# Patient Record
Sex: FEMALE | Race: WHITE | NOT HISPANIC OR LATINO | Employment: PART TIME | URBAN - METROPOLITAN AREA
[De-identification: names, ages, dates, MRNs, and addresses within clinical notes are randomized per-mention and may not be internally consistent; named-entity substitution may affect disease eponyms.]

---

## 2017-03-22 ENCOUNTER — OFFICE VISIT (OUTPATIENT)
Dept: OBSTETRICS AND GYNECOLOGY | Facility: CLINIC | Age: 52
End: 2017-03-22
Payer: COMMERCIAL

## 2017-03-22 VITALS
DIASTOLIC BLOOD PRESSURE: 84 MMHG | HEIGHT: 60 IN | BODY MASS INDEX: 30.21 KG/M2 | SYSTOLIC BLOOD PRESSURE: 132 MMHG | WEIGHT: 153.88 LBS

## 2017-03-22 DIAGNOSIS — Z12.31 VISIT FOR SCREENING MAMMOGRAM: ICD-10-CM

## 2017-03-22 DIAGNOSIS — Z01.419 ROUTINE GYNECOLOGICAL EXAMINATION: Primary | ICD-10-CM

## 2017-03-22 DIAGNOSIS — N93.9 ABNORMAL UTERINE BLEEDING (AUB): ICD-10-CM

## 2017-03-22 PROCEDURE — 99999 PR PBB SHADOW E&M-EST. PATIENT-LVL III: CPT | Mod: PBBFAC,,, | Performed by: OBSTETRICS & GYNECOLOGY

## 2017-03-22 PROCEDURE — 87591 N.GONORRHOEAE DNA AMP PROB: CPT

## 2017-03-22 PROCEDURE — 88305 TISSUE EXAM BY PATHOLOGIST: CPT | Performed by: PATHOLOGY

## 2017-03-22 PROCEDURE — 88175 CYTOPATH C/V AUTO FLUID REDO: CPT

## 2017-03-22 PROCEDURE — 99396 PREV VISIT EST AGE 40-64: CPT | Mod: S$GLB,,, | Performed by: OBSTETRICS & GYNECOLOGY

## 2017-03-22 PROCEDURE — 88305 TISSUE EXAM BY PATHOLOGIST: CPT | Mod: 26,,, | Performed by: PATHOLOGY

## 2017-03-22 RX ORDER — PREDNISONE 10 MG/1
TABLET ORAL
Refills: 2 | COMMUNITY
Start: 2017-01-27 | End: 2017-03-22

## 2017-03-22 RX ORDER — CETIRIZINE HYDROCHLORIDE 5 MG/1
5 TABLET, CHEWABLE ORAL DAILY
COMMUNITY
End: 2020-02-04

## 2017-03-22 RX ORDER — MONTELUKAST SODIUM 10 MG/1
TABLET ORAL
Refills: 0 | COMMUNITY
Start: 2016-12-19 | End: 2017-03-22

## 2017-03-22 RX ORDER — CLARITHROMYCIN 500 MG/1
TABLET, FILM COATED ORAL
Refills: 1 | COMMUNITY
Start: 2017-01-15 | End: 2017-03-22

## 2017-03-22 RX ORDER — BUTALBITAL, ACETAMINOPHEN AND CAFFEINE 50; 325; 40 MG/1; MG/1; MG/1
TABLET ORAL
Refills: 2 | COMMUNITY
Start: 2016-12-19 | End: 2018-10-29

## 2017-03-22 NOTE — MR AVS SNAPSHOT
[place] [facility]  [address], [address]  [address]th [address] [facility]  [place] [address]  Phone: [phone]                  yMrtle Archer   3/22/2017 3:30 PM   Office Visit    Description:  Female : 1965   Provider:  Kylie Fraser MD   Department:  [facility]           Reason for Visit     Annual Exam           Diagnoses this Visit        Comments    Routine gynecological examination    -  Primary     Abnormal uterine bleeding (AUB)         Visit for screening mammogram                To Do List           Future Appointments        Provider Department Dept Phone    2017 9:15 AM MD [facility] - [phone]      Goals (5 Years of Data)     None      [facility]     [facility] Care [facility] -  Assistance  Registered nurses in the [facility] On Call Center provide clinical advisement, health education, appointment booking, and other advisory services.  Call for this free service at [phone].             Medications           Message regarding Medications     Verify the changes and/or additions to your medication regime listed below are the same as discussed with your clinician today.  If any of these changes or additions are incorrect, please notify your healthcare provider.        STOP taking these medications     predniSONE (DELTASONE) 10 MG tablet TK 1 T PO BID FOR CONGESTION WITH FOOD    clarithromycin (BIAXIN) 500 MG tablet TK 1 T PO  BID FOR INFECTION    montelukast (SINGULAIR) 10 mg tablet TK 1 T PO QD FOR ALLERGY AND CONGESTION           Verify that the below list of medications is an accurate representation of the medications you are currently taking.  If none reported, the list may be blank. If incorrect, please contact your healthcare provider. Carry this list with you in case of emergency.           Current Medications     butalbital-acetaminophen-caffeine -40 mg (FIORICET, ESGIC) -40 mg per tablet TK 1 T PO  Q 4 H PRF  PAIN/HEADACHE    cetirizine (ZYRTEC) 5 MG chewable tablet Take 5 mg by mouth once daily.    phentermine (ADIPEX-P) 37.5 mg tablet Take 37.5 mg by mouth every morning.    trazodone (DESYREL) 50 MG tablet     venlafaxine (EFFEXOR-XR) 150 MG Cp24            Clinical Reference Information           Your Vitals Were     BP Height Weight Last Period BMI    132/84 5' (1.524 m) 69.8 kg (153 lb 14.1 oz) 02/15/2017 30.05 kg/m2      Blood Pressure          Most Recent Value    BP  132/84      Allergies as of 3/22/2017     No Known Allergies      Immunizations Administered on Date of Encounter - 3/22/2017     None      Orders Placed During Today's Visit      Normal Orders This Visit    C. trachomatis/N. gonorrhoeae by AMP DNA Cervix     Liquid-based pap smear, screening     Tissue Specimen To Pathology, Obstetrics/Gynecology     Future Labs/Procedures Expected by Expires    Mammo Digital Screening Bilat with Tomosynthesis CAD  3/22/2017 5/21/2018      [facility] Sign-Up     Activating your [facility] account is as easy as 1-2-3!     1) Visit [email or web address], select Sign Up Now, enter this activation code and your date of birth, then select Next.  [email or web address]  Expires: 5/6/2017  4:46 PM      2) Create a username and password to use when you visit [facility] in the future and select a security question in case you lose your password and select Next.    3) Enter your e-mail address and click Sign Up!    Additional Information  If you have questions, please e-mail [email or web address] or call [phone] to talk to our [facility] staff. Remember, [facility] is NOT to be used for urgent needs. For medical emergencies, dial 911.         Smoking Cessation     If you would like to quit smoking:   You may be eligible for free services if you are a [place] resident and started smoking cigarettes before September 1, 1988.  Call the Smoking [employer]essation [employer] (SCT) toll free at (861) 244-6739 or (449) 801-7568.   Call 9-800-QUIT-NOW if  you do not meet the above criteria.            Language Assistance Services     ATTENTION: Language assistance services are available, free of charge. Please call [phone].      ATENCIÓN: Si habla liane, tiene a holman disposición servicios gratuitos de asistencia lingüística. Llame al [phone].     CHÚ Ý: N?u b?n nói Ti?ng Vi?t, có các d?ch v? h? tr? ngôn ng? mi?n phí dành cho b?n. G?i s? [phone].         [facility] complies with applicable Federal civil rights laws and does not discriminate on the basis of race, color, national origin, age, disability, or sex.

## 2017-03-22 NOTE — PROGRESS NOTES
52 yo female who presents for routine gyn visit.  Patient reports for last 6  Months that her cycles have become a little bit abnormal.  Reports that cycles used to come q 4 wks and last for about 5 days with normal flow, no pain.  In recent months, cycles have been coming q 6 to 8 weeks. She continues to have normal flow and no pain with her cycles.    Patient reports that she now has severe HAs - and is unsure if this is related to hormonal changes as she approaches menopause. Problem has been investigated by PCP and recent MRI was normal.    Patient is . No h/o STDs. But, she desires Gc/chl today.    No h/o abl mammogram - but she does report family history of breast cancer (sister with early diagnosis of breast cancer).    Patient reports that in the past, she has had breast tenderness with her cycles. However, her breasts seem to be more tender even when she is not on her cycle.    Patient is a smoker.    ROS:  GENERAL: Denies weight gain or weight loss. Feeling well overall.   SKIN: Denies rash or lesions.   CHEST: Denies chest pain or shortness of breath.   CARDIOVASCULAR: Denies palpitations or left sided chest pain.   ABDOMEN: No abdominal pain, constipation, diarrhea, nausea, vomiting or rectal bleeding.   URINARY: No frequency, dysuria, hematuria, or burning on urination.  REPRODUCTIVE: See HPI.   BREASTS: per HPI  HEMATOLOGIC: No easy bruisability or excessive bleeding.   MUSCULOSKELETAL: Denies joint pain or swelling.   NEUROLOGIC: Denies syncope or weakness.   PSYCHIATRIC: Denies depression, anxiety or mood swings.     PE:   Vitals: /84  Ht 5' (1.524 m)  Wt 69.8 kg (153 lb 14.1 oz)  LMP 02/15/2017  BMI 30.05 kg/m2  APPEARANCE: Well nourished, well developed, in no acute distress.  CHEST: Lungs clear to auscultation.  HEART: Regular rate and rhythm, no murmurs, rubs or gallops.  ABDOMEN: Soft. No tenderness or masses. No hepatosplenomegaly. No hernias.  BREASTS: Symmetrical, no skin  changes or visible lesions. No palpable masses, nipple discharge or adenopathy bilaterally.  PELVIC: Normal external female genitalia without lesions. Normal hair distribution. Adequate perineal body, normal urethral meatus. Vagina moist and well rugated without lesions or discharge. Cervix pink and without lesions. No significant cystocele or rectocele. Bimanual exam showed uterus normal size, shape, position, mobile and nontender. Adnexa without masses or tenderness. Urethra and bladder normal.  EXTREMITIES: No clubbing cyanosis or edema.      Date:3/22/2017  Time:4:47 PM  Name of the procedure: Endometrial Biopsy  Indications: Myrtle Archer is a 51 y.o. female  who presents today for endometrial biopsy secondary to Abnormal uterine bleeding.  Patient's last menstrual period was 02/15/2017..    Patient consent: Risks/benefits of the procedure were discussed with the patient. Patient's questions were answered.  Consents signed.   TIME OUT completed.  Labs:   Procedure: Speculum placed in vagina; Pap collected; GC/Chl; cervix swabbed with betadine x 2; ring foceps placed on anterior cervix.  Endometrial pipelle advanced without difficulty x 1 pass; single tooth tenaculum removed.  Hemostasis achieved.  Complications: none  EBL: min  Disposition: Pt tolerated the procedure well.    AP  Routine gyn  -s/p normal breast exam: mammogram ordered  -s/p normal pelvic exam:   -Pap smear  -STD testing:  Gc/chl collected, declined HIV  -colonoscopy: completed in  - due in  per GI note  -AUB: EMS collected; Patient instructed to contact MD or report to emergency room for fever greater than 100.4F, vaginal bleeding greater than 2 pads/hour, severe abdominal pain not relieved with NSAIDs.    If EMS is negative for malignancy, will contact patient. She discussed starting HRT to help with HA and hot flashes - but does not want to start that now.    ANA MARÍA Fraser MD

## 2017-03-23 LAB
C TRACH DNA SPEC QL NAA+PROBE: NOT DETECTED
N GONORRHOEA DNA SPEC QL NAA+PROBE: NOT DETECTED

## 2017-03-29 ENCOUNTER — HOSPITAL ENCOUNTER (OUTPATIENT)
Dept: RADIOLOGY | Facility: HOSPITAL | Age: 52
Discharge: HOME | End: 2017-03-29
Attending: OBSTETRICS & GYNECOLOGY
Payer: COMMERCIAL

## 2017-03-29 DIAGNOSIS — Z12.31 VISIT FOR SCREENING MAMMOGRAM: ICD-10-CM

## 2017-03-29 PROCEDURE — 77067 SCR MAMMO BI INCL CAD: CPT | Mod: TC

## 2017-03-29 PROCEDURE — 77063 BREAST TOMOSYNTHESIS BI: CPT | Mod: 26,,, | Performed by: RADIOLOGY

## 2017-03-29 PROCEDURE — 77067 SCR MAMMO BI INCL CAD: CPT | Mod: 26,,, | Performed by: RADIOLOGY

## 2017-03-30 ENCOUNTER — PATIENT MESSAGE (OUTPATIENT)
Dept: OBSTETRICS AND GYNECOLOGY | Facility: CLINIC | Age: 52
End: 2017-03-30

## 2017-03-31 ENCOUNTER — TELEPHONE (OUTPATIENT)
Dept: OBSTETRICS AND GYNECOLOGY | Facility: CLINIC | Age: 52
End: 2017-03-31

## 2017-03-31 NOTE — TELEPHONE ENCOUNTER
Endometrial biopsy results still in process. No result yet.   I see that you are scheduled for f/u mammogram.   We await the results.   Dr dudley

## 2017-03-31 NOTE — TELEPHONE ENCOUNTER
Do you have the results for uterus biopsy?  Also I have to have a  second mammogram. Myrtle

## 2017-04-03 ENCOUNTER — TELEPHONE (OUTPATIENT)
Dept: OBSTETRICS AND GYNECOLOGY | Facility: CLINIC | Age: 52
End: 2017-04-03

## 2017-04-03 NOTE — TELEPHONE ENCOUNTER
Unable to reach pt. I need to let her know that her BX results are still pending.  Once we receive them  We will contact her back.

## 2017-04-05 ENCOUNTER — HOSPITAL ENCOUNTER (OUTPATIENT)
Dept: RADIOLOGY | Facility: HOSPITAL | Age: 52
Discharge: HOME | End: 2017-04-05
Attending: OBSTETRICS & GYNECOLOGY
Payer: COMMERCIAL

## 2017-04-05 DIAGNOSIS — R92.8 ABNORMAL FINDING ON BREAST IMAGING: ICD-10-CM

## 2017-04-05 PROCEDURE — 77061 BREAST TOMOSYNTHESIS UNI: CPT | Mod: TC,LT

## 2017-04-05 PROCEDURE — 76642 ULTRASOUND BREAST LIMITED: CPT | Mod: 26,LT,, | Performed by: RADIOLOGY

## 2017-04-05 PROCEDURE — 77061 BREAST TOMOSYNTHESIS UNI: CPT | Mod: 26,LT,, | Performed by: RADIOLOGY

## 2017-04-05 PROCEDURE — 76642 ULTRASOUND BREAST LIMITED: CPT | Mod: TC,LT

## 2017-04-05 PROCEDURE — 77065 DX MAMMO INCL CAD UNI: CPT | Mod: 26,LT,, | Performed by: RADIOLOGY

## 2018-10-10 ENCOUNTER — TELEPHONE (OUTPATIENT)
Dept: OBSTETRICS AND GYNECOLOGY | Facility: CLINIC | Age: 53
End: 2018-10-10

## 2018-10-10 NOTE — TELEPHONE ENCOUNTER
I spoke with her and explained that Dr. Fraser usually does those orders with the annual exam.  I scheduled h er for her annual on Oct 29.  She understood and had no further questions.

## 2018-10-10 NOTE — TELEPHONE ENCOUNTER
----- Message from Priyank Gupta sent at 10/10/2018 12:38 PM CDT -----  Contact: self  Pt called in about wanting to schedule mammogram. Please put order in system and contact pt to schedule appt      Pt can be reached at [phone]      TY

## 2018-10-29 ENCOUNTER — OFFICE VISIT (OUTPATIENT)
Dept: OBSTETRICS AND GYNECOLOGY | Facility: CLINIC | Age: 53
End: 2018-10-29
Payer: COMMERCIAL

## 2018-10-29 VITALS
WEIGHT: 156.31 LBS | SYSTOLIC BLOOD PRESSURE: 122 MMHG | HEIGHT: 60 IN | BODY MASS INDEX: 30.69 KG/M2 | DIASTOLIC BLOOD PRESSURE: 76 MMHG

## 2018-10-29 DIAGNOSIS — Z01.419 ENCOUNTER FOR GYNECOLOGICAL EXAMINATION WITHOUT ABNORMAL FINDING: Primary | ICD-10-CM

## 2018-10-29 DIAGNOSIS — Z12.39 SCREENING BREAST EXAMINATION: ICD-10-CM

## 2018-10-29 PROCEDURE — 99396 PREV VISIT EST AGE 40-64: CPT | Mod: S$GLB,,, | Performed by: OBSTETRICS & GYNECOLOGY

## 2018-10-29 PROCEDURE — 99999 PR PBB SHADOW E&M-EST. PATIENT-LVL III: CPT | Mod: PBBFAC,,, | Performed by: OBSTETRICS & GYNECOLOGY

## 2018-10-29 NOTE — PROGRESS NOTES
53 y/o  presents for routine gyn visit.  She reports her hot flashes have resolved.  Patient reports menstrual cycles q6w, duration 6 days.   She desires weight loss, is on OTC supplement from [facility], but wants better recommendation.   She is , and denies any new sexual partners, declines STD test today.  Pap last year normal - declines pap today.  Mammogram last year normal; if no lumps found on exam toady, then she wishes to do it in November due to insurance reasons ( was recently laid off).  She is a smoker - smokes 1 pack x 2-3 days for 32 years     ROS:  GENERAL: Denies weight gain or weight loss. Feeling well overall.   SKIN: Denies rash or lesions.   HEAD: Denies head injury or headache.   CHEST: Denies chest pain or shortness of breath.   CARDIOVASCULAR: Denies palpitations or left sided chest pain.   ABDOMEN: No abdominal pain, constipation, diarrhea, nausea, vomiting or rectal bleeding.   URINARY: No frequency, dysuria, hematuria, or burning on urination.  REPRODUCTIVE: See HPI.   BREASTS: denies pain, lumps, or nipple discharge.   HEMATOLOGIC: No easy bruisability or excessive bleeding.   MUSCULOSKELETAL: Denies joint pain or swelling.   NEUROLOGIC: Denies syncope or weakness.   PSYCHIATRIC: Denies depression, anxiety or mood swings.   ?  ?  PE:  /76   Ht 5' (1.524 m)   Wt 70.9 kg (156 lb 4.9 oz)   LMP 10/22/2018   BMI 30.53 kg/m²   APPEARANCE: Well nourished, well developed, in no acute distress.  SKIN: Normal skin turgor, no lesions.  NODES: No cervical or axillary lymph node enlargement.  CHEST: Lungs clear to auscultation.  HEART: Regular rate and rhythm, no murmurs, rubs or gallops.  ABDOMEN: Soft. No tenderness or masses. No hepatosplenomegaly. No hernias.  BREASTS: Symmetrical, no skin changes or visible lesions. No palpable masses, nipple discharge or adenopathy bilaterally.  PELVIC: Normal external female genitalia without lesions. Normal hair distribution. Adequate  perineal body, normal urethral meatus. Vagina moist and well rugated without lesions or discharge. Cervix pink and without lesions. No significant cystocele or rectocele. Bimanual exam showed uterus normal size, shape, position, mobile and nontender. Adnexa without masses or tenderness. Urethra and bladder normal.  EXTREMITIES: No clubbing cyanosis or edema.     AP  Routine GYN  -s/p normal breast exam: mammogram ordered   -s/p normal pelvic exam  -Pap smear 2017 WNL; declined today  -Declined STD testing  -colonoscopy: completed in 2015 - due in 2020 per GI note  - endometrial bx 2017 normal     F/u in one year    alcon dudley MD

## 2018-10-29 NOTE — MEDICAL/APP STUDENT
53 y/o  presents for annual GYN.  She reports her hot flashes are gone.  Patient reports menstrual cycles q6w, bleeds for about 6 days.   She desires weight loss, is on OTC supplement from [facility], but wants better recommendation.   She is , and denies any new sexual partners, declines STD test today.  Pap last year normal - declines pap today.  Mammogram last year normal; if no lumps found on exam toady, then she wishes to do it in November due to insurance reasons ( was recently laid off).  She is a smoker - smokes 1 pack x 2-3 days for 32 years    ROS:  GENERAL: Denies weight gain or weight loss. Feeling well overall.   SKIN: Denies rash or lesions.   HEAD: Denies head injury or headache.   NODES: Denies enlarged lymph nodes.   CHEST: Denies chest pain or shortness of breath.   CARDIOVASCULAR: Denies palpitations or left sided chest pain.   ABDOMEN: No abdominal pain, constipation, diarrhea, nausea, vomiting or rectal bleeding.   URINARY: No frequency, dysuria, hematuria, or burning on urination.  REPRODUCTIVE: See HPI.   BREASTS: The patient performs breast self-examination and denies pain, lumps, or nipple discharge.   HEMATOLOGIC: No easy bruisability or excessive bleeding.   MUSCULOSKELETAL: Denies joint pain or swelling.   NEUROLOGIC: Denies syncope or weakness.   PSYCHIATRIC: Denies depression, anxiety or mood swings.   ?  ?  PE:  /76   Ht 5' (1.524 m)   Wt 70.9 kg (156 lb 4.9 oz)   LMP 10/22/2018   BMI 30.53 kg/m²   APPEARANCE: Well nourished, well developed, in no acute distress.  SKIN: Normal skin turgor, no lesions.  NODES: No cervical or axillary lymph node enlargement.  CHEST: Lungs clear to auscultation.  HEART: Regular rate and rhythm, no murmurs, rubs or gallops.  ABDOMEN: Soft. No tenderness or masses. No hepatosplenomegaly. No hernias.  BREASTS: Symmetrical, no skin changes or visible lesions. No palpable masses, nipple discharge or adenopathy bilaterally.  PELVIC: Normal  external female genitalia without lesions. Normal hair distribution. Adequate perineal body, normal urethral meatus. Vagina moist and well rugated without lesions or discharge. Cervix pink and without lesions. No significant cystocele or rectocele. Bimanual exam showed uterus normal size, shape, position, mobile and nontender. Adnexa without masses or tenderness. Urethra and bladder normal.  EXTREMITIES: No clubbing cyanosis or edema.    AP  Routine GYN  -s/p normal breast exam: mammogram ordered for Nov due to insurance reasons  -s/p normal pelvic exam  -Pap smear 2017 WNL; declined today  -Declined STD testing  -colonoscopy: completed in 2015 - due in 2020 per GI note  - endometrial bx 2017 normal     Zehra Rider, MS3

## 2018-11-08 ENCOUNTER — HOSPITAL ENCOUNTER (OUTPATIENT)
Dept: RADIOLOGY | Facility: HOSPITAL | Age: 53
Discharge: HOME | End: 2018-11-08
Attending: OBSTETRICS & GYNECOLOGY
Payer: COMMERCIAL

## 2018-11-08 DIAGNOSIS — Z12.39 SCREENING BREAST EXAMINATION: ICD-10-CM

## 2018-11-08 PROCEDURE — 77067 SCR MAMMO BI INCL CAD: CPT | Mod: TC

## 2018-11-08 PROCEDURE — 77063 BREAST TOMOSYNTHESIS BI: CPT | Mod: TC

## 2018-11-08 PROCEDURE — 77063 BREAST TOMOSYNTHESIS BI: CPT | Mod: 26,,, | Performed by: RADIOLOGY

## 2018-11-08 PROCEDURE — 77067 SCR MAMMO BI INCL CAD: CPT | Mod: 26,,, | Performed by: RADIOLOGY

## 2018-11-12 ENCOUNTER — TELEPHONE (OUTPATIENT)
Dept: OBSTETRICS AND GYNECOLOGY | Facility: CLINIC | Age: 53
End: 2018-11-12

## 2018-11-12 NOTE — TELEPHONE ENCOUNTER
----- Message from Kaylie Guerra sent at 11/12/2018  1:14 PM CST -----  Contact: Self [phone]  Patient Returning Your Phone Call

## 2018-11-12 NOTE — TELEPHONE ENCOUNTER
----- Message from Kylie Fraser MD sent at 11/12/2018  7:42 AM CST -----  On your mammogram report they are now looking at whether women are high risk or low risk for breast cancer. You tested above 20% and new breast imaging guidelines state that if at high risk for breast cancer (a 20% or higher lifetime risk of breast cancer to age 85) the patient may need additional imaging, a screening MRI. This can be alternated every 6 months or both done at annual screening. If you would like to proceed I will place an order for a breast MRI which I would recommend you do in 6 months versus doing a mammogram and MRI of the breast at the same time. You may want to check with your insurance for coverage because even though it is a recommended screening you may not have coverage or you maybe responsible for a deductible.    Let me know if you have any questions.    Dr Fraser

## 2019-03-19 ENCOUNTER — TELEPHONE (OUTPATIENT)
Dept: OBSTETRICS AND GYNECOLOGY | Facility: CLINIC | Age: 54
End: 2019-03-19

## 2019-03-19 NOTE — TELEPHONE ENCOUNTER
----- Message from Mali Ashley sent at 3/19/2019 11:57 AM CDT -----  Contact: self, [phone]  Patient requests to be seen sooner than the next available appointment on 3/29.States she is menopausal and is having ovary pain. Please advise.

## 2019-03-19 NOTE — TELEPHONE ENCOUNTER
Called patient and she informed us about having the heaviest first ever flow of menstrual bleeding this month which is currently going into two weeks long now. Also has noticed pain radiating from her right ovary the past two days. She reports taking OTC Ibuprofen and has increased her salt intake. I advised patient that what she is experiencing is normal as she is going towards menopausea and to continue taking up to 800 mg of Ibuprofen every 6 hrs for pain . Advised her if symptoms get worst to give us a call back to schedule her in for a sooner appointment other than her currently scheduled appointment on 3/29/2019 . Patient verbalized and had no further questions.

## 2019-10-29 ENCOUNTER — HOSPITAL ENCOUNTER (OUTPATIENT)
Facility: HOSPITAL | Age: 54
Discharge: OTHER INSTITUTION | End: 2019-10-30
Attending: EMERGENCY MEDICINE | Admitting: INTERNAL MEDICINE
Payer: MEDICAID

## 2019-10-29 DIAGNOSIS — F41.9 ANXIETY: ICD-10-CM

## 2019-10-29 DIAGNOSIS — R45.851 SUICIDAL IDEATION: ICD-10-CM

## 2019-10-29 DIAGNOSIS — T50.902A INTENTIONAL DRUG OVERDOSE, INITIAL ENCOUNTER: Primary | ICD-10-CM

## 2019-10-29 DIAGNOSIS — F32.1 CURRENT MODERATE EPISODE OF MAJOR DEPRESSIVE DISORDER, UNSPECIFIED WHETHER RECURRENT: ICD-10-CM

## 2019-10-29 DIAGNOSIS — Z00.8 MEDICAL CLEARANCE FOR PSYCHIATRIC ADMISSION: ICD-10-CM

## 2019-10-29 DIAGNOSIS — E87.6 HYPOKALEMIA: ICD-10-CM

## 2019-10-29 LAB
ALBUMIN SERPL BCP-MCNC: 4.2 G/DL (ref 3.5–5.2)
ALP SERPL-CCNC: 66 U/L (ref 55–135)
ALT SERPL W/O P-5'-P-CCNC: 22 U/L (ref 10–44)
AMPHET+METHAMPHET UR QL: NEGATIVE
ANION GAP SERPL CALC-SCNC: 15 MMOL/L (ref 8–16)
APAP SERPL-MCNC: <3 UG/ML (ref 10–20)
AST SERPL-CCNC: 24 U/L (ref 10–40)
BACTERIA #/AREA URNS HPF: ABNORMAL /HPF
BARBITURATES UR QL SCN>200 NG/ML: NEGATIVE
BASOPHILS # BLD AUTO: 0.03 K/UL (ref 0–0.2)
BASOPHILS NFR BLD: 0.4 % (ref 0–1.9)
BENZODIAZ UR QL SCN>200 NG/ML: NEGATIVE
BILIRUB SERPL-MCNC: 0.3 MG/DL (ref 0.1–1)
BILIRUB UR QL STRIP: NEGATIVE
BUN SERPL-MCNC: 6 MG/DL (ref 6–20)
BZE UR QL SCN: NEGATIVE
CALCIUM SERPL-MCNC: 9.2 MG/DL (ref 8.7–10.5)
CANNABINOIDS UR QL SCN: NEGATIVE
CHLORIDE SERPL-SCNC: 101 MMOL/L (ref 95–110)
CLARITY UR: CLEAR
CO2 SERPL-SCNC: 21 MMOL/L (ref 23–29)
COLOR UR: YELLOW
CREAT SERPL-MCNC: 0.8 MG/DL (ref 0.5–1.4)
CREAT UR-MCNC: 89.2 MG/DL (ref 15–325)
DIFFERENTIAL METHOD: NORMAL
EOSINOPHIL # BLD AUTO: 0.1 K/UL (ref 0–0.5)
EOSINOPHIL NFR BLD: 0.8 % (ref 0–8)
ERYTHROCYTE [DISTWIDTH] IN BLOOD BY AUTOMATED COUNT: 12.8 % (ref 11.5–14.5)
EST. GFR  (AFRICAN AMERICAN): >60 ML/MIN/1.73 M^2
EST. GFR  (NON AFRICAN AMERICAN): >60 ML/MIN/1.73 M^2
ETHANOL SERPL-MCNC: 93 MG/DL
GLUCOSE SERPL-MCNC: 120 MG/DL (ref 70–110)
GLUCOSE UR QL STRIP: NEGATIVE
HCT VFR BLD AUTO: 42.4 % (ref 37–48.5)
HGB BLD-MCNC: 14.4 G/DL (ref 12–16)
HGB UR QL STRIP: ABNORMAL
KETONES UR QL STRIP: NEGATIVE
LEUKOCYTE ESTERASE UR QL STRIP: NEGATIVE
LYMPHOCYTES # BLD AUTO: 1.8 K/UL (ref 1–4.8)
LYMPHOCYTES NFR BLD: 25.5 % (ref 18–48)
MCH RBC QN AUTO: 30.6 PG (ref 27–31)
MCHC RBC AUTO-ENTMCNC: 34 G/DL (ref 32–36)
MCV RBC AUTO: 90 FL (ref 82–98)
METHADONE UR QL SCN>300 NG/ML: NEGATIVE
MICROSCOPIC COMMENT: ABNORMAL
MONOCYTES # BLD AUTO: 0.4 K/UL (ref 0.3–1)
MONOCYTES NFR BLD: 5.9 % (ref 4–15)
NEUTROPHILS # BLD AUTO: 4.8 K/UL (ref 1.8–7.7)
NEUTROPHILS NFR BLD: 67.4 % (ref 38–73)
NITRITE UR QL STRIP: NEGATIVE
OPIATES UR QL SCN: NEGATIVE
PCP UR QL SCN>25 NG/ML: NEGATIVE
PH UR STRIP: 6 [PH] (ref 5–8)
PLATELET # BLD AUTO: 299 K/UL (ref 150–350)
PMV BLD AUTO: 9.7 FL (ref 9.2–12.9)
POTASSIUM SERPL-SCNC: 2.8 MMOL/L (ref 3.5–5.1)
PROT SERPL-MCNC: 7.4 G/DL (ref 6–8.4)
PROT UR QL STRIP: NEGATIVE
RBC # BLD AUTO: 4.7 M/UL (ref 4–5.4)
RBC #/AREA URNS HPF: 5 /HPF (ref 0–4)
SALICYLATES SERPL-MCNC: <5 MG/DL (ref 15–30)
SODIUM SERPL-SCNC: 137 MMOL/L (ref 136–145)
SP GR UR STRIP: 1.01 (ref 1–1.03)
SQUAMOUS #/AREA URNS HPF: 5 /HPF
TOXICOLOGY INFORMATION: NORMAL
TSH SERPL DL<=0.005 MIU/L-ACNC: 0.74 UIU/ML (ref 0.4–4)
URN SPEC COLLECT METH UR: ABNORMAL
UROBILINOGEN UR STRIP-ACNC: NEGATIVE EU/DL
WBC # BLD AUTO: 7.1 K/UL (ref 3.9–12.7)
WBC #/AREA URNS HPF: 1 /HPF (ref 0–5)

## 2019-10-29 PROCEDURE — 85025 COMPLETE CBC W/AUTO DIFF WBC: CPT

## 2019-10-29 PROCEDURE — 80053 COMPREHEN METABOLIC PANEL: CPT

## 2019-10-29 PROCEDURE — 99285 EMERGENCY DEPT VISIT HI MDM: CPT | Mod: 25

## 2019-10-29 PROCEDURE — 25000003 PHARM REV CODE 250: Performed by: STUDENT IN AN ORGANIZED HEALTH CARE EDUCATION/TRAINING PROGRAM

## 2019-10-29 PROCEDURE — 80307 DRUG TEST PRSMV CHEM ANLYZR: CPT

## 2019-10-29 PROCEDURE — 63600175 PHARM REV CODE 636 W HCPCS: Performed by: EMERGENCY MEDICINE

## 2019-10-29 PROCEDURE — G0378 HOSPITAL OBSERVATION PER HR: HCPCS

## 2019-10-29 PROCEDURE — 84443 ASSAY THYROID STIM HORMONE: CPT

## 2019-10-29 PROCEDURE — 96360 HYDRATION IV INFUSION INIT: CPT

## 2019-10-29 PROCEDURE — 80320 DRUG SCREEN QUANTALCOHOLS: CPT

## 2019-10-29 PROCEDURE — 93005 ELECTROCARDIOGRAM TRACING: CPT

## 2019-10-29 PROCEDURE — 81000 URINALYSIS NONAUTO W/SCOPE: CPT | Mod: 59

## 2019-10-29 PROCEDURE — 80329 ANALGESICS NON-OPIOID 1 OR 2: CPT

## 2019-10-29 RX ORDER — ACETAMINOPHEN 325 MG/1
650 TABLET ORAL EVERY 8 HOURS PRN
Status: DISCONTINUED | OUTPATIENT
Start: 2019-10-29 | End: 2019-10-30 | Stop reason: HOSPADM

## 2019-10-29 RX ORDER — POTASSIUM CHLORIDE 20 MEQ/1
40 TABLET, EXTENDED RELEASE ORAL ONCE
Status: COMPLETED | OUTPATIENT
Start: 2019-10-29 | End: 2019-10-29

## 2019-10-29 RX ORDER — POTASSIUM CHLORIDE 7.45 MG/ML
10 INJECTION INTRAVENOUS
Status: DISCONTINUED | OUTPATIENT
Start: 2019-10-29 | End: 2019-10-29

## 2019-10-29 RX ORDER — RAMELTEON 8 MG/1
8 TABLET ORAL NIGHTLY PRN
Status: DISCONTINUED | OUTPATIENT
Start: 2019-10-29 | End: 2019-10-30 | Stop reason: HOSPADM

## 2019-10-29 RX ORDER — SODIUM CHLORIDE 0.9 % (FLUSH) 0.9 %
10 SYRINGE (ML) INJECTION
Status: DISCONTINUED | OUTPATIENT
Start: 2019-10-29 | End: 2019-10-30 | Stop reason: HOSPADM

## 2019-10-29 RX ORDER — POTASSIUM CHLORIDE 29.8 MG/ML
40 INJECTION INTRAVENOUS ONCE
Status: DISCONTINUED | OUTPATIENT
Start: 2019-10-29 | End: 2019-10-29

## 2019-10-29 RX ADMIN — POTASSIUM CHLORIDE 40 MEQ: 1500 TABLET, EXTENDED RELEASE ORAL at 10:10

## 2019-10-29 RX ADMIN — RAMELTEON 8 MG: 8 TABLET, FILM COATED ORAL at 11:10

## 2019-10-29 RX ADMIN — SODIUM CHLORIDE 1000 ML: 0.9 INJECTION, SOLUTION INTRAVENOUS at 03:10

## 2019-10-29 NOTE — ED PROVIDER NOTES
"Encounter Date: 10/29/2019    SCRIBE #1 NOTE: I, Maya Anguiano, am scribing for, and in the presence of,  Dr. Werner. I have scribed the entire note.       History     Chief Complaint   Patient presents with    Drug Overdose      called EMS- he reports pt took between 3 or 4 Trazodone and 3 or 4 Effexor. She reports to EMS that "I am tired of feeling the pain". Pt and her  are going through a divorce. Pt had anxiety attack in the ambulance. Also reports Nausea.      Myrtle Archer is a 53 y.o. female who  has a past medical history of Anxiety.    The patient presents to the ED via EMS due to drug overdose that occurred about 2 hours ago. Per [facility] [place], patient was sending son text messages about "taking care of the mortgage" which prompted a phone call to his mother. Patient told son, who lives out of town, that she had taken multiple pills and drank alcohol. Upon arrival to home, police reports patient admits to taking about 3-4 Trazodone and Effexor. Patient states she started taking the medication and alcohol at 11 AM and stopped at around 2 PM. Police also found about 4 mini bottles of wine near patient. Police asked patient why to which she responded with "I can't take the pain anymore" and reports she is  from her . Patient states her and her  have been  for about 2 weeks, but today they met in person where he told her he "doesn't want to work it out" which made patient upset. Police also reports finding about 5 hand written suicide notes addressed to each of her children and her  . Patient has medical history of anxiety. EMS reports patient had an anxiety attack en route to ED. Patient also reports associated nausea.     The history is provided by the patient and the police.     Review of patient's allergies indicates:  No Known Allergies  Past Medical History:   Diagnosis Date    Anxiety      Past Surgical History:   Procedure Laterality Date    " BREAST BIOPSY Right      SECTION      CHOLECYSTECTOMY      COLONOSCOPY N/A 10/23/2015    Procedure: COLONOSCOPY;  Surgeon: Franny Leong MD;  [facility];  Service: Endoscopy;  Laterality: N/A;    tonsilllectomy      TUBAL LIGATION       Family History   Problem Relation Age of Onset    Cancer Father     Cancer Mother     Breast cancer Sister      Social History     Tobacco Use    Smoking status: Current Some Day Smoker   Substance Use Topics    Alcohol use: No    Drug use: No     Review of Systems   Gastrointestinal: Positive for nausea.   Psychiatric/Behavioral: Positive for dysphoric mood and suicidal ideas. The patient is nervous/anxious.    All other systems reviewed and are negative.      Physical Exam     Initial Vitals [10/29/19 1436]   BP Pulse Resp Temp SpO2   (!) 146/78 (!) 112 18 98 °F (36.7 °C) 98 %      MAP       --         Physical Exam    Nursing note and vitals reviewed.  Constitutional: She appears well-developed and well-nourished. She is not diaphoretic. No distress.   HENT:   Head: Normocephalic and atraumatic.   Mouth/Throat: Oropharynx is clear and moist.   Eyes: EOM are normal. Pupils are equal, round, and reactive to light.   Neck: Normal range of motion. Neck supple.   Cardiovascular: Normal rate, regular rhythm and normal heart sounds. Exam reveals no gallop and no friction rub.    No murmur heard.  Pulmonary/Chest: Breath sounds normal. No respiratory distress. She has no wheezes. She has no rhonchi. She has no rales.   Abdominal: Soft. She exhibits no distension. There is no tenderness. There is no rebound and no guarding.   Musculoskeletal: Normal range of motion. She exhibits no edema or tenderness.   Lymphadenopathy:     She has no cervical adenopathy.   Neurological: She is alert and oriented to person, place, and time.   Skin: Skin is warm and dry.         ED Course   Procedures  Labs Reviewed   ALCOHOL,MEDICAL (ETHANOL) - Abnormal; Notable for  the following components:       Result Value    Alcohol, Medical, Serum 93 (*)     All other components within normal limits   ACETAMINOPHEN LEVEL - Abnormal; Notable for the following components:    Acetaminophen (Tylenol), Serum <3.0 (*)     All other components within normal limits   SALICYLATE LEVEL - Abnormal; Notable for the following components:    Salicylate Lvl <5.0 (*)     All other components within normal limits   CBC W/ AUTO DIFFERENTIAL   COMPREHENSIVE METABOLIC PANEL   TSH   URINALYSIS, REFLEX TO URINE CULTURE   DRUG SCREEN PANEL, URINE EMERGENCY     EKG Readings: (Independently Interpreted)   1514. Normal Sinus rhythm with rate of 92 bpm. Nonspecific T wave abnormality. No STEMI.        Imaging Results    None          Medical Decision Making:   Independently Interpreted Test(s):   I have ordered and independently interpreted EKG Reading(s) - see prior notes  Clinical Tests:   Lab Tests: Ordered and Reviewed  Medical Tests: Ordered and Reviewed  ED Management:  Poison Control had been consulted who recommended observational period of 18 to 24 hours due to the extended nature of these drugs. Patient remains awake and alert.               Attending Attestation:           Physician Attestation for Scribe:  Physician Attestation Statement for Scribe #1: I, Dr. Tai Werner, reviewed documentation, as scribed by Maya Anguiano in my presence, and it is both accurate and complete.                    Clinical Impression:       ICD-10-CM ICD-9-CM   1. Intentional drug overdose, initial encounter T50.902A 977.9     E950.5   2. Medical clearance for psychiatric admission Z00.8 V70.8   3. Suicidal ideation R45.851 V62.84         Disposition:   Disposition: Admitted         Tai Werner MD  10/30/19 0857

## 2019-10-29 NOTE — ED NOTES
Pt has 2 belongings bags.   1 belongings bag: shirt, pants, shoes, 2 bracelets, bra, and jacket.  2 belongings bag: grocery bag with cell phone, glasses, contact solution, contact case, phone . Pt also has a black purse with extra belongings inside purse.

## 2019-10-29 NOTE — ED NOTES
"Pt reports that her  informed her this morning that he was filing for divorce. The pt then took 4 Effexor, 3 Trazadone, and drank 3 small bottles of [place]osc[place]. Pt then called her son and informed him of what was going on and what she did and he called 911. Pt arrived here via EMS/[facility]. [facility] reported that the pt had written several "notes" to various family members and friends. Pt denies that the notes are suicide notes. Pt states that she had "no intention of killing [herself] but just wanted to numb the pain".  Pt arrives AAOx4, tearful, upset, and calm and cooperative.   "

## 2019-10-29 NOTE — ED NOTES
Pt's  updated on plan of care per pt request. Pt is requesting that the  be updated with any new information.     : Dayne Archer: [phone]

## 2019-10-29 NOTE — ED NOTES
Poison control center contacted. Spoke to Yulisa Carreno RN. Recommends cardiac monitoring. Also recommends checking a drug screen, tylenol level, and alcohol level. Monitor for hypoglycemia, agitation, tachycardia, seizures, or serotonin syndrome. Recommends benzodiazepines for any agitation, tachycardia, or seizure activity. States if pt took extended release effexor then she will need to be monitored for at least 18-20 hours.

## 2019-10-30 ENCOUNTER — CLINICAL SUPPORT (OUTPATIENT)
Dept: SMOKING CESSATION | Facility: CLINIC | Age: 54
End: 2019-10-30
Payer: COMMERCIAL

## 2019-10-30 VITALS
DIASTOLIC BLOOD PRESSURE: 68 MMHG | HEART RATE: 71 BPM | WEIGHT: 145 LBS | RESPIRATION RATE: 20 BRPM | TEMPERATURE: 99 F | HEIGHT: 60 IN | SYSTOLIC BLOOD PRESSURE: 124 MMHG | OXYGEN SATURATION: 96 % | BODY MASS INDEX: 28.47 KG/M2

## 2019-10-30 DIAGNOSIS — F17.210 CIGARETTE SMOKER: Primary | ICD-10-CM

## 2019-10-30 LAB
ALBUMIN SERPL BCP-MCNC: 3.6 G/DL (ref 3.5–5.2)
ALP SERPL-CCNC: 57 U/L (ref 55–135)
ALT SERPL W/O P-5'-P-CCNC: 17 U/L (ref 10–44)
ANION GAP SERPL CALC-SCNC: 8 MMOL/L (ref 8–16)
AST SERPL-CCNC: 18 U/L (ref 10–40)
BASOPHILS # BLD AUTO: 0.06 K/UL (ref 0–0.2)
BASOPHILS NFR BLD: 0.9 % (ref 0–1.9)
BILIRUB SERPL-MCNC: 0.5 MG/DL (ref 0.1–1)
BUN SERPL-MCNC: 6 MG/DL (ref 6–20)
CALCIUM SERPL-MCNC: 8.5 MG/DL (ref 8.7–10.5)
CHLORIDE SERPL-SCNC: 106 MMOL/L (ref 95–110)
CO2 SERPL-SCNC: 26 MMOL/L (ref 23–29)
CREAT SERPL-MCNC: 0.8 MG/DL (ref 0.5–1.4)
DIFFERENTIAL METHOD: NORMAL
EOSINOPHIL # BLD AUTO: 0.2 K/UL (ref 0–0.5)
EOSINOPHIL NFR BLD: 3.1 % (ref 0–8)
ERYTHROCYTE [DISTWIDTH] IN BLOOD BY AUTOMATED COUNT: 13 % (ref 11.5–14.5)
EST. GFR  (AFRICAN AMERICAN): >60 ML/MIN/1.73 M^2
EST. GFR  (NON AFRICAN AMERICAN): >60 ML/MIN/1.73 M^2
GLUCOSE SERPL-MCNC: 91 MG/DL (ref 70–110)
HCT VFR BLD AUTO: 40.9 % (ref 37–48.5)
HGB BLD-MCNC: 13.4 G/DL (ref 12–16)
LYMPHOCYTES # BLD AUTO: 2.1 K/UL (ref 1–4.8)
LYMPHOCYTES NFR BLD: 31.4 % (ref 18–48)
MCH RBC QN AUTO: 30 PG (ref 27–31)
MCHC RBC AUTO-ENTMCNC: 32.8 G/DL (ref 32–36)
MCV RBC AUTO: 92 FL (ref 82–98)
MONOCYTES # BLD AUTO: 0.7 K/UL (ref 0.3–1)
MONOCYTES NFR BLD: 9.7 % (ref 4–15)
NEUTROPHILS # BLD AUTO: 3.7 K/UL (ref 1.8–7.7)
NEUTROPHILS NFR BLD: 54.9 % (ref 38–73)
PLATELET # BLD AUTO: 290 K/UL (ref 150–350)
PMV BLD AUTO: 9.6 FL (ref 9.2–12.9)
POTASSIUM SERPL-SCNC: 4.3 MMOL/L (ref 3.5–5.1)
PROT SERPL-MCNC: 6.4 G/DL (ref 6–8.4)
RBC # BLD AUTO: 4.47 M/UL (ref 4–5.4)
SODIUM SERPL-SCNC: 140 MMOL/L (ref 136–145)
WBC # BLD AUTO: 6.81 K/UL (ref 3.9–12.7)

## 2019-10-30 PROCEDURE — 94761 N-INVAS EAR/PLS OXIMETRY MLT: CPT

## 2019-10-30 PROCEDURE — 90471 IMMUNIZATION ADMIN: CPT | Performed by: INTERNAL MEDICINE

## 2019-10-30 PROCEDURE — 80053 COMPREHEN METABOLIC PANEL: CPT

## 2019-10-30 PROCEDURE — 99282 EMERGENCY DEPT VISIT SF MDM: CPT | Mod: 95,SA,HB,S$PBB | Performed by: NURSE PRACTITIONER

## 2019-10-30 PROCEDURE — 99407 BEHAV CHNG SMOKING > 10 MIN: CPT | Mod: S$GLB,,,

## 2019-10-30 PROCEDURE — 99999 PR PBB SHADOW E&M-EST. PATIENT-LVL I: ICD-10-PCS | Mod: PBBFAC,,,

## 2019-10-30 PROCEDURE — G0378 HOSPITAL OBSERVATION PER HR: HCPCS

## 2019-10-30 PROCEDURE — 85025 COMPLETE CBC W/AUTO DIFF WBC: CPT

## 2019-10-30 PROCEDURE — 63600175 PHARM REV CODE 636 W HCPCS: Performed by: INTERNAL MEDICINE

## 2019-10-30 PROCEDURE — 99282 PR EMERGENCY DEPT VISIT,LEVEL II: ICD-10-PCS | Mod: 95,SA,HB,S$PBB | Performed by: NURSE PRACTITIONER

## 2019-10-30 PROCEDURE — 99407 PR TOBACCO USE CESSATION INTENSIVE >10 MINUTES: ICD-10-PCS | Mod: S$GLB,,,

## 2019-10-30 PROCEDURE — 90686 IIV4 VACC NO PRSV 0.5 ML IM: CPT | Performed by: INTERNAL MEDICINE

## 2019-10-30 PROCEDURE — 36415 COLL VENOUS BLD VENIPUNCTURE: CPT

## 2019-10-30 PROCEDURE — 99999 PR PBB SHADOW E&M-EST. PATIENT-LVL I: CPT | Mod: PBBFAC,,,

## 2019-10-30 RX ADMIN — INFLUENZA VIRUS VACCINE 0.5 ML: 15; 15; 15; 15 SUSPENSION INTRAMUSCULAR at 01:10

## 2019-10-30 NOTE — NURSING
Transport arrived to  patient. Documentation and belongings sent to facility. Family informed of transfer.

## 2019-10-30 NOTE — CONSULTS
"[facility]  Psychiatry  Telepsychiatry Consult Note    Please see previous notes:    Patient agreeable to consultation via telepsychiatry.    Tele-Consultation from Psychiatry started: 10/30/2019 at 1040  The chief complaint leading to psychiatric consultation is: suicidal   This consultation was requested by Akiko Banks MD the Emergency Department attending physician.  The location of the consulting psychiatrist is [address] [place] [address].  The patient location is  [facility] TELEMETRY UNIT   The patient arrived at the ED at: 10/29  Also present with the patient at the time of the consultation: nurse    Patient Identification:   Myrtle Archer is a 53 y.o. female.    Patient information was obtained from patient.  Patient presented involuntarily to the Emergency Department by ambulance where the patient received see Ambulance Run Sheet prior to arrival.    Consults  Subjective:     History of Present Illness:    Per ED History: Patient was sending son text messages about "taking care of the mortgage" which prompted a phone call to his mother. Patient told son, who lives out of town, that she had taken multiple pills and drank alcohol. Upon arrival to home, police reports patient admits to taking about 3-4 Trazodone and Effexor. Patient states she started taking the medication and alcohol at 11 AM and stopped at around 2 PM. Police also found about 4 mini bottles of wine near patient. Police asked patient why to which she responded with "I can't take the pain anymore" and reports she is  from her     Psychiatric Interview: Pt AAOx3 and appears calm and cooperative. Pt reports multiple situational and family-related stressors which triggered suicidal event.  Denies hx of previous attempt.  Endorses new onset of panic attacks that began after  left (2 weeks ago) which occur daily and often several times per day.  Also continues to endorse depression symptoms: " "dysphoria, anger, decreased appetite, trouble sleeping (relieved with Trazadone).  Pt appears to minimize the severity of her behavior. She lives alone with daughter who is in 7th grade.  Remains focused on her financial stressors related to family separation. Also discussed work-related stressors.  Pt does not have an outpatient provider with psychiatry but [facility]; first session was this week.      Psychiatric History:   Previous Psychiatric Hospitalizations: No   Previous Medication Trials: Yes , Effexor, Trazadone  Previous Suicide Attempts: most recent: yesterday   History of Violence: no  History of Depression: yes  History of Lynda: no  History of Auditory/Visual Hallucination no  History of Delusions: no  Outpatient psychiatrist (current & past): No    Substance Abuse History:  Tobacco:No  Alcohol: Yes  Illicit Substances:No  Detox/Rehab: No    Legal History: Past charges/incarcerations: No     Family Psychiatric History: denies    Social History:  Education:some college  Employment Status/Finances:Employed   Relationship Status/Sexual Orientation: :  Relationship strained  Children: 2  Housing Status: Home    history:  NO  Access to gun: NO    Psychiatric Mental Status Exam:  Arousal: alert  Sensorium/Orientation: oriented to grossly intact  Behavior/Cooperation: normal, cooperative   Speech: normal tone, normal rate, normal pitch, normal volume  Language: grossly intact  Mood: " dysphoric "   Affect: blunted  Thought Process: normal and logical  Thought Content:   Auditory hallucinations: NO  Visual hallucinations: NO  Paranoia: NO  Delusions:  NO  Suicidal ideation: NO  Homicidal ideation: NO  Attention/Concentration:  intact  Memory:    Recent:  Intact   Remote: Intact   3/3 immediate, 3/3 at 5 min  Fund of Knowledge: Intact   Abstract reasoning: proverbs were abstract  Insight: intact  Judgment: behavior is adequate to " circumstances      Past Medical History:   Past Medical History:   Diagnosis Date    Anxiety       Laboratory Data:   Labs Reviewed   COMPREHENSIVE METABOLIC PANEL - Abnormal; Notable for the following components:       Result Value    Potassium 2.8 (*)     CO2 21 (*)     Glucose 120 (*)     All other components within normal limits   URINALYSIS, REFLEX TO URINE CULTURE - Abnormal; Notable for the following components:    Occult Blood UA 2+ (*)     All other components within normal limits    Narrative:     Preferred Collection Type->Urine, Clean Catch   ALCOHOL,MEDICAL (ETHANOL) - Abnormal; Notable for the following components:    Alcohol, Medical, Serum 93 (*)     All other components within normal limits   ACETAMINOPHEN LEVEL - Abnormal; Notable for the following components:    Acetaminophen (Tylenol), Serum <3.0 (*)     All other components within normal limits   SALICYLATE LEVEL - Abnormal; Notable for the following components:    Salicylate Lvl <5.0 (*)     All other components within normal limits   URINALYSIS MICROSCOPIC - Abnormal; Notable for the following components:    RBC, UA 5 (*)     All other components within normal limits    Narrative:     Preferred Collection Type->Urine, Clean Catch   CBC W/ AUTO DIFFERENTIAL   TSH   DRUG SCREEN PANEL, URINE EMERGENCY    Narrative:     Preferred Collection Type->Urine, Clean Catch       Neurological History:  Seizures: No  Head trauma: No    Allergies:   Review of patient's allergies indicates:  No Known Allergies    Medications in ER:   Medications   sodium chloride 0.9% flush 10 mL (has no administration in time range)   acetaminophen tablet 650 mg (has no administration in time range)   influenza (QUADRIVALENT PF) vaccine 0.5 mL (has no administration in time range)   ramelteon tablet 8 mg (8 mg Oral Given 10/29/19 3420)   sodium chloride 0.9% bolus 1,000 mL (0 mLs Intravenous Stopped 10/29/19 1642)   potassium chloride SA CR tablet 40 mEq (40 mEq Oral Given  10/29/19 2220)       Medications at home: Effexor  mg po daily, Trazadone 100 mg po qhs    No new subjective & objective note has been filed under this hospital service since the last note was generated.      Assessment - Diagnosis - Goals:     Diagnosis/Impression: Major depressive disorder, recurrent, severe  Panic attacks  Suicide attempt    Rec: Once medically cleared seek involuntary inpatient psychiatric admission for stabilization of acute psychiatric symptoms. Continue PEC because pt is in imminent danger of hurting self.     Psych Meds: continue scheduled meds    PRN Zyprexa 5 mg q 6h PO/IM for non redirectable agitation; do not combine or administer within one hour of giving a benzodiazepine.         Time with patient: 30 minutes      More than 50% of the time was spent counseling/coordinating care    Consulting clinician was informed of the encounter and consult note.    Consultation ended: 10/30/2019 at 1200    Rocky Muir III, NP   Psychiatry  [facility]

## 2019-10-30 NOTE — PROGRESS NOTES
Individual Follow-Up Form    10/30/2019    Quit Date: To be determined    Clinical Status of Patient: Inpatient    Length of Service: 30 minutes    Comments: Smoking cessation education provided.  Pt denies nicotine withdrawal symptoms. She states that she is ready to quit smoking and she was enrolled in the [employer] during this encounter.      Diagnosis: F17.210    Next Visit:  Ambulatory referral to smoking cessation program.

## 2019-10-30 NOTE — NURSING
Patient transferred to floor from ER. Safety maintained. Telemetry monitoring initiated. Sitter at bedside. Assistance provided as needed.

## 2019-10-30 NOTE — PLAN OF CARE
called transfer center, [phone], patient is PEC'd and  showed up with one person. Center is re-routing patient's transportation, no ETA at this time. Will try to expedite.        10/30/19 1709   Post-Acute Status   Post-Acute Authorization Placement   Post-Acute Placement Status Set-up Complete

## 2019-10-30 NOTE — H&P
"[facility] Medicine H&P Note     Admitting Team: [facility] Hospitalist Team A  Attending Physician: Mildred Griffith MD  Resident: Jocelyn  Intern: Oscar     Date of Admit: 10/29/2019    Chief Complaint     Overdose on trazodone and effexor once    Subjective:      History of Present Illness:  Myrtle Archer is a 53 y.o. female with past medical history of anxiety who presented to [facility] after intentional drug overdose with trazodone and effexor. Patient states that for the past 2 years since her  was laid off from his job they have had increased stress in their lives and their marriage has been suffering. This morning at around 8 am her  told her he was leaving and patient states "I just couldn't take the pain anymore". She wrote letters to her family and proceeded to take about 4 Effexor 150 mg and 3-4 Trazodone 50 mg pills. Patient also states alcohol consumption along with the pills (4 miniature wine bottles). Patient states several life stressors including marital problems with her  and a daughter who is transgender for which she has had a difficult time coping. She recently started seeing a counselor (last week was the first time) and was planning on marriage counseling with her . She denies intent to kill herself, stating that if "I really wanted to do it I would have taken more pills". She denies any current suicidal ideation and does not have a plan to harm herself. There is not a gun in her home and she has never had a previous suicide attempt. Patient texted son about overdose which prompted a call to the police. Denies chest pain, shortness of breath, abdominal pain, N/V, dysuria, constipation, diarrhea.       Past Medical History:  Past Medical History:   Diagnosis Date    Anxiety        Past Surgical History:  Past Surgical History:   Procedure Laterality Date    BREAST BIOPSY Right      SECTION      CHOLECYSTECTOMY      COLONOSCOPY N/A 10/23/2015    Procedure: " COLONOSCOPY;  Surgeon: Franny Leong MD;  Location: [facility];  Service: Endoscopy;  Laterality: N/A;    TONSILLECTOMY      tonsilllectomy      TUBAL LIGATION         Allergies:  Review of patient's allergies indicates:  No Known Allergies    Home Medications:  Prior to Admission medications    Medication Sig Start Date End Date Taking? Authorizing Provider   cetirizine (ZYRTEC) 5 MG chewable tablet Take 5 mg by mouth once daily.   Yes Historical Provider, MD   trazodone (DESYREL) 50 MG tablet every evening.  7/15/15  Yes Historical Provider, MD   venlafaxine (EFFEXOR-XR) 150 MG Cp24 once daily.  13  Yes Historical Provider, MD       Family History:  Family History   Problem Relation Age of Onset    Cancer Father     Cancer Mother     Breast cancer Sister        Social History:  Social History     Tobacco Use    Smoking status: Current Every Day Smoker     Packs/day: 0.50   Substance Use Topics    Alcohol use: Yes     Comment: occasional    Drug use: No       Review of Systems:  Pertinent positive as above. All other systems are reviewed and are negative.    Health Maintaince :   Primary Care Physician: Dr. Briones    Immunizations:   TDap up to date    Flu up to date     Cancer Screening:  PAP: up to date  MMG: up to date     Objective:   Last 24 Hour Vital Signs:  BP  Min: 107/57  Max: 146/78  Temp  Av.4 °F (36.3 °C)  Min: 96.5 °F (35.8 °C)  Max: 98.1 °F (36.7 °C)  Pulse  Av.7  Min: 76  Max: 112  Resp  Av.5  Min: 16  Max: 18  SpO2  Av.6 %  Min: 94 %  Max: 98 %  Height  Av' (152.4 cm)  Min: 5' (152.4 cm)  Max: 5' (152.4 cm)  Weight  Av.8 kg (145 lb)  Min: 65.8 kg (145 lb)  Max: 65.8 kg (145 lb)  Body mass index is 28.32 kg/m².  I/O last 3 completed shifts:  In: 1000 [IV Piggyback:1000]  Out: -     Physical Examination:    General: Alert and awake, tearful  Head:  Normocephalic and atraumatic  Eyes:  PERRL; EOMi with anicteric sclera and clear  conjunctivae  Mouth:  Oropharynx clear and without exudate; moist mucous membranes  Cardio:  Regular rate and rhythm with normal S1 and S2; no murmurs or rubs  Resp:  CTAB; respirations unlabored; no wheezes, crackles or rhonchi  Abdom: Soft, NTND with normoactive bowel sounds  Extrem: Warm and well-perfused with no clubbing, cyanosis or edema  Skin:  No rashes, lesions, or color changes  Pulses: 2+ and symmetric distally  Neuro:  AAOx3; no focal deficits    Laboratory:  Most Recent Data:  CBC:   Lab Results   Component Value Date    WBC 7.10 10/29/2019    HGB 14.4 10/29/2019    HCT 42.4 10/29/2019     10/29/2019    MCV 90 10/29/2019    RDW 12.8 10/29/2019     WBC Differential: 67.4 % N, 0 % Bands, 25.5 % L, 5.9 % M, 0.8 % Eo, 0.4 % Baso, no additional cells seen  BMP:   Lab Results   Component Value Date     10/29/2019    K 2.8 (L) 10/29/2019     10/29/2019    CO2 21 (L) 10/29/2019    BUN 6 10/29/2019    CREATININE 0.8 10/29/2019     (H) 10/29/2019    CALCIUM 9.2 10/29/2019     LFTs:   Lab Results   Component Value Date    PROT 7.4 10/29/2019    ALBUMIN 4.2 10/29/2019    BILITOT 0.3 10/29/2019    AST 24 10/29/2019    ALKPHOS 66 10/29/2019    ALT 22 10/29/2019     Coags: No results found for: INR, PROTIME, PTT  FLP: No results found for: CHOL, HDL, LDLCALC, TRIG, CHOLHDL  DM:   Lab Results   Component Value Date    CREATININE 0.8 10/29/2019     Thyroid:   Lab Results   Component Value Date    TSH 0.743 10/29/2019     Anemia: No results found for: IRON, TIBC, FERRITIN, XSUYCKYF90, FOLATE  Cardiac: No results found for: TROPONINI, CKTOTAL, CKMB, BNP  Urinalysis:   Lab Results   Component Value Date    LABURIN  09/02/2015     STAPHYLOCOCCUS EPIDERMIDIS  10,000 - 49,999 cfu/ml      COLORU Yellow 10/29/2019    SPECGRAV 1.010 10/29/2019    NITRITE Negative 10/29/2019    KETONESU Negative 10/29/2019    UROBILINOGEN Negative 10/29/2019    WBCUA 1 10/29/2019       Trended Lab Data:  Recent Labs  "  Lab 10/29/19  1505   WBC 7.10   HGB 14.4   HCT 42.4      MCV 90   RDW 12.8      K 2.8*      CO2 21*   BUN 6   CREATININE 0.8   *   PROT 7.4   ALBUMIN 4.2   BILITOT 0.3   AST 24   ALKPHOS 66   ALT 22       Trended Cardiac Data:  No results for input(s): TROPONINI, CKTOTAL, CKMB, BNP in the last 168 hours.    Microbiology Data:  none    Other Results:  EKG (my interpretation): normal sinus rhythm with     Radiology:  Imaging Results    None          Assessment:     Myrtle Archer is a 53 y.o. female with:  Patient Active Problem List    Diagnosis Date Noted    Intentional drug overdose 10/29/2019    Special screening for malignant neoplasm of colon 10/23/2015    Abdominal pain, RLQ (right lower quadrant) 10/08/2015        Plan:   Intentional Drug Overdose  Patient with recent life stressors including separation form , took 4 Effexor 150 mg and 3-4 Trazodone 50 mg because "I just couldn't take the pain anymore". Pills consumed with alcohol, alcohol level elevated in ED. Denies any history of depression or previous suicide attempt, no guns in the home. Denies current suicidal ideation or plan. Denies auditory/visual hallucinations. EKG obtained with normal sinus rhythm, . Drug screen negative.   -1 L NS given  -PEC in place, continue direct psych observation  -consulted tele-psych     Hypokalemia  Potassium 2.8 on admission.  -Replete with 40 mEQ KCl PO    Anxiety  History of anxiety, recently began seeing a counselor. On home medications of Effexor 150 mg and Trazodone 50 mg for the past 4 years.    Diet: Regular  DVT PPx: SCD  Dispo: pending psychiatry evaluation and drug overdose monitoring     Code Status:     Full    Brendan Moore MD  [facility] Internal Medicine HO-I    [facility] Medicine Hospitalist Pager numbers:   [facility] Hospitalist Medicine Team A (Karsten/Nacho): [phone]  [facility] Hospitalist Medicine Team B (Mame/Kristen):  [phone]        "

## 2019-10-30 NOTE — PLAN OF CARE
sent referral for IP- Psych to 1) [facility] and 2) [facility].       10/30/19 1202   Post-Acute Status   Post-Acute Authorization Placement   Post-Acute Placement Status Referrals Sent

## 2019-10-30 NOTE — DISCHARGE SUMMARY
"\[facility]\"" Hospital Medicine Discharge Summary    Primary Team: \[facility]\"" Hospitalist Team A  Attending Physician: Mildred Griffith MD  Resident: Jocelyn  Intern: Oscar    Date of Admit: 10/29/2019  Date of Discharge: 10/30/2019    Discharge to: [facility]  Condition: Stable    Discharge Diagnoses     Patient Active Problem List   Diagnosis    Abdominal pain, RLQ (right lower quadrant)    Special screening for malignant neoplasm of colon    Intentional drug overdose    Hypokalemia    Anxiety    Suicidal ideation    Current moderate episode of major depressive disorder       Consultants and Procedures     Consultants:  Psychiatry    Procedures:   None    Imaging:  None    Brief History of Present Illness   Myrtle Archer is a 53 y.o. female with past medical history of anxiety who presented to [facility] after intentional drug overdose with trazodone and effexor. Patient states that for the past 2 years since her  was laid off from his job they have had increased stress in their lives and their marriage has been suffering. This morning at around 8 am her  told her he was leaving and patient states "I just couldn't take the pain anymore". She wrote letters to her family and proceeded to take about 4 Effexor 150 mg and 3-4 Trazodone 50 mg pills. Patient also states alcohol consumption along with the pills (4 miniature wine bottles). Patient states several life stressors including marital problems with her  and a daughter who is transgender for which she has had a difficult time coping. She recently started seeing a counselor (last week was the first time) and was planning on marriage counseling with her . She denies intent to kill herself, stating that if "I really wanted to do it I would have taken more pills". She denies any current suicidal ideation and does not have a plan to harm herself. There is not a gun in her home and she has never had a previous suicide attempt. Patient texted " "son about overdose which prompted a call to the police. Denies chest pain, shortness of breath, abdominal pain, N/V, dysuria, constipation, diarrhea.     For the full HPI please refer to the History & Physical from this admission.    Hospital Course By Problem with Pertinent Findings   Intentional Drug Overdose  Patient with recent life stressors including separation from , took 4 Effexor 150 mg and 3-4 Trazodone 50 mg because "I just couldn't take the pain anymore". Pills consumed with alcohol, alcohol level elevated in ED. Denies any history of depression or previous suicide attempt, no guns in the home. Denies current suicidal ideation or plan. Denies auditory/visual hallucinations. EKG obtained with normal sinus rhythm,  and EKG without arrhythmia. Drug screen negative. PEC in place in ED, patient evaluated by tele-psych who recommended inpatient psychiatry treatment.   -Patient to go to [facility] inpatient psychiatric facility     Hypokalemia, resolved  Potassium 2.8 on admission and repleted with improvement to 4.3 on discharge.     Anxiety  History of anxiety, recently began seeing a counselor. On home medications of Effexor 150 mg and Trazodone 50 mg for the past 4 years.  -Adjust Effexor and Trazodone per recommendations at inpatient psychiatric facility    Discharge Medications      Myrtle Archer   Home Medication Instructions TERRELL:[id number]    Printed on:10/30/19 1320   Medication Information                      cetirizine (ZYRTEC) 5 MG chewable tablet  Take 5 mg by mouth once daily.                 Discharge Information:   Diet:  Regular    Physical Activity:  As tolerated             Instructions:  1. Take all medications as prescribed  2. Keep all follow-up appointments  3. Return to the hospital or call your primary care physicians if any worsening symptoms such as fever, chest pain, shortness of breath, return of symptoms, or any other concerns.    Follow-Up Appointments:  PCP, " Psychiatry     Brendan Moore MD  [facility] Internal Medicine, \[facility]\""

## 2019-10-30 NOTE — PLAN OF CARE
[facility]    FACILITY TRANSFER ORDERS      Patient Name: Myrtle Archer  YOB: 1965    PCP: Robbie Briones MD   PCP Address: [address] [place] [address] [place] [facility]  PCP Phone Number: [phone]  PCP Fax: [phone]    Encounter Date: 10/30/2019    Admit to: [facility]    Vital Signs:  Routine    Diagnoses:   Active Hospital Problems    Diagnosis  POA    *Intentional drug overdose [T50.902A]  Yes    Current moderate episode of major depressive disorder [F32.1]  Unknown    Hypokalemia [E87.6]  Yes    Anxiety [F41.9]  Yes    Suicidal ideation [R45.851]  Not Applicable      Resolved Hospital Problems   No resolved problems to display.       Allergies:Review of patient's allergies indicates:  No Known Allergies    Diet: regular diet    Activities: Activity as tolerated    Medications: Review discharge medications with patient and family and provide education.      Current Discharge Medication List      CONTINUE these medications which have NOT CHANGED    Details   cetirizine (ZYRTEC) 5 MG chewable tablet Take 5 mg by mouth once daily.         STOP taking these medications       trazodone (DESYREL) 50 MG tablet Comments:   Reason for Stopping:         venlafaxine (EFFEXOR-XR) 150 MG Cp24 Comments:   Reason for Stopping:                    _________________________________  Akiko Banks MD  10/30/2019

## 2019-10-30 NOTE — PLAN OF CARE
POC reviewed, pt verbalize understanding. Pt denies pain/discomfort. PEC'd sitter at the bedside. Administered ramelteon prn as charted.  Fall precaution maintained: bed on lowest position, call light within reach, bed alarm set, side rail up x 2, non skid sock on. Will continue to monitor.

## 2019-10-30 NOTE — PROGRESS NOTES
[facility] Medicine Progress Note    Primary Team: [facility] Hospitalist Team A  Attending Physician: Mildred Griffith MD  Resident: Yadi Banks MD  Intern: Bijan Minor MD    Subjective:   Pt slept well overnight. She states she currently does not endorse suicidal or homicidal ideation. She denies fevers, chills, chest pain, SOB, abd pain, nausea, vomiting, or diarrhea.      Objective:     Last 24 Hour Vital Signs:  BP  Min: 107/57  Max: 146/78  Temp  Av.2 °F (36.2 °C)  Min: 96.4 °F (35.8 °C)  Max: 98.1 °F (36.7 °C)  Pulse  Av.9  Min: 71  Max: 112  Resp  Av.7  Min: 16  Max: 18  SpO2  Av.4 %  Min: 94 %  Max: 98 %  Height  Av' (152.4 cm)  Min: 5' (152.4 cm)  Max: 5' (152.4 cm)  Weight  Av.8 kg (145 lb)  Min: 65.8 kg (145 lb)  Max: 65.8 kg (145 lb)  I/O last 3 completed shifts:  In: 1000 [IV Piggyback:1000]  Out: -     Physical Examination:  Physical Exam   Constitutional: She is oriented to person, place, and time and well-developed, well-nourished, and in no distress. No distress.   HENT:   Head: Normocephalic and atraumatic.   Eyes: Right eye exhibits no discharge. Left eye exhibits no discharge.   Neck: Normal range of motion. Neck supple.   Cardiovascular: Normal rate, regular rhythm, normal heart sounds and intact distal pulses. Exam reveals no gallop and no friction rub.   No murmur heard.  Pulmonary/Chest: Effort normal and breath sounds normal. No stridor. No respiratory distress. She has no wheezes. She has no rales.   Abdominal: Soft. Bowel sounds are normal. She exhibits no distension. There is no tenderness.   Musculoskeletal: Normal range of motion. She exhibits no edema or deformity.   Neurological: She is alert and oriented to person, place, and time.   Skin: Skin is warm and dry. She is not diaphoretic.   Nursing note and vitals reviewed.      Laboratory:  Laboratory Data Reviewed: yes  Pertinent Findings:  -Calcium 8.5     Microbiology Data Reviewed: yes  Pertinent  "Findings:  Microbiology Results (last 7 days)     ** No results found for the last 168 hours. **          Other Results:  EKG (my interpretation): none except for yesterday.     Radiology Data Reviewed: yes  Pertinent Findings:  Imaging Results    None         Current Medications:     Infusions:       Scheduled:       PRN:  acetaminophen, influenza, ramelteon, sodium chloride 0.9%    Antibiotics and Day Number of Therapy:  -    Lines and Day Number of Therapy:  PIV left wrist    Assessment:     Myrtle Archer is a 53 y.o.female with  Patient Active Problem List    Diagnosis Date Noted    Intentional drug overdose 10/29/2019    Hypokalemia 10/29/2019    Anxiety 10/29/2019    Suicidal ideation     Special screening for malignant neoplasm of colon 10/23/2015    Abdominal pain, RLQ (right lower quadrant) 10/08/2015        Plan:   Intentional Drug Overdose  Patient with recent life stressors including separation from , took 4 Effexor 150 mg and 3-4 Trazodone 50 mg because "I just couldn't take the pain anymore". Pills consumed with alcohol, alcohol level elevated in ED. Denies any history of depression or previous suicide attempt, no guns in the home. Denies current suicidal ideation or plan. Denies auditory/visual hallucinations. EKG obtained with normal sinus rhythm, . Drug screen negative.   -1 L NS given  -PEC in place, continue direct psych observation  -Seen by tele-psych  -On reassessment this am, no complaints and denies SI or HI currently.   -EKG: No arhythmia present.     Hypokalemia  -Potassium 2.8 on admission    -Repleted with 40 mEQ KCl PO  -Current potassium at 4.3      Anxiety  History of anxiety, recently began seeing a counselor. On home medications of Effexor 150 mg and Trazodone 50 mg for the past 4 years.     Diet: Regular  DVT PPx: SCD  Dispo: Pt is medically ready to go to psych rehab.       Bijan Minor MD  [facility] HO-1    [facility] Medicine Hospitalist Pager numbers: "   [facility] Hospitalist Medicine Team A (Karsten/Nacho): [phone]  [facility] Hospitalist Medicine Team B (Mame/Kristen):  [phone]

## 2019-10-30 NOTE — PLAN OF CARE
spoke with bedside nurse, Yanni, informed of number for report, [facility], [phone], to nurse taking care of . SW informed nurse that transportation will be set for 2 PM. Nurse verbalized understanding.       10/30/19 1249   Final Note   Assessment Type Final Discharge Note   Anticipated Discharge Disposition Psych   What phone number can be called within the next 1-3 days to see how you are doing after discharge? [phone]   Hospital Follow Up  Appt(s) scheduled? No   Discharge plans and expectations educations in teach back method with documentation complete? Yes   Right Care Referral Info   Post Acute Recommendation Other   Facility Name [facility] [address] [place], [place] , [address]

## 2019-10-30 NOTE — PLAN OF CARE
Austen spoke with [facility], [phone], formerly known as [facility], intake department informed [facility] patient was accepted. WIll follow up with number for report to [facility] within the hour.       10/30/19 3963   Post-Acute Status   Post-Acute Authorization Placement   Post-Acute Placement Status Patient Evaluation by Facility

## 2019-10-30 NOTE — PLAN OF CARE
"Patient AAOx3  Independent with ADL's  Lives at home with 12 year old child (patient states her child)    Patient very tearful stating, "I have too many issues to deal with"    Patient states she does go to outpatient therapy with Dr. Gil(sp?) and she has her child in therapy.    Medicaid is pending    Patient is PEC  telepsych MD saw patient per charge nurse and needs IP psych placement.    SW to work on IP psych placement.       10/30/19 1204   Discharge Assessment   Assessment Type Discharge Planning Assessment   Confirmed/corrected address and phone number on facesheet? Yes   Assessment information obtained from? Patient   Prior to hospitilization cognitive status: Alert/Oriented   Prior to hospitalization functional status: Independent   Current cognitive status: Alert/Oriented   Current Functional Status: Independent   Lives With spouse   Able to Return to Prior Arrangements no   Is patient able to care for self after discharge? Yes   Patient's perception of discharge disposition home or selfcare   Readmission Within the Last 30 Days no previous admission in last 30 days   Patient currently being followed by outpatient case management? No   Patient currently receives any other outside agency services? No   Equipment Currently Used at Home none   Do you have any problems affording any of your prescribed medications? No   Is the patient taking medications as prescribed? yes   Does the patient have transportation home? Yes   Transportation Anticipated other (see comments)   Discharge Plan [facility] hospital   Patient/Family in Agreement with Plan yes     Fabi Alvarado, RN, CCM, CMSRN  RN Transition Navigator  [phone]      "

## 2019-10-30 NOTE — NURSING
Patient safety maintained. Assistance provided as needed. Sitter at bedside. Printed discharge ready for transfer. IV and telemetry removed patient tolerated well. Report called at 1341, spoke with Monique.

## 2019-10-30 NOTE — PLAN OF CARE
Patient PEC/CEC  SW working on final discharge arrangements       10/30/19 1342   Final Note   Assessment Type Final Discharge Note   Anticipated Discharge Disposition Psych   Right Care Referral Info   Post Acute Recommendation Other   Referral Type IP psych   Facility Name [facility]     Fabi Alvarado, RN, CCM, CMSRN  RN Transition Navigator  [phone]

## 2019-12-02 ENCOUNTER — TELEPHONE (OUTPATIENT)
Dept: OBSTETRICS AND GYNECOLOGY | Facility: CLINIC | Age: 54
End: 2019-12-02

## 2019-12-02 DIAGNOSIS — Z12.31 VISIT FOR SCREENING MAMMOGRAM: Primary | ICD-10-CM

## 2019-12-02 NOTE — TELEPHONE ENCOUNTER
Pt is scheduled for her annual exam on 1/6/2020  Pt is requesting orders for mammogram to be placed so she can schedule her appt the same day

## 2019-12-02 NOTE — TELEPHONE ENCOUNTER
----- Message from Trudi Jimenez sent at 12/2/2019 12:40 PM CST -----  Contact: [phone]/self   Patient is requesting orders for mammogram and also schedule an annual appt for her and her daughter. Please advise.

## 2020-02-04 ENCOUNTER — OFFICE VISIT (OUTPATIENT)
Dept: OBSTETRICS AND GYNECOLOGY | Facility: CLINIC | Age: 55
End: 2020-02-04
Payer: MEDICAID

## 2020-02-04 ENCOUNTER — HOSPITAL ENCOUNTER (OUTPATIENT)
Dept: RADIOLOGY | Facility: HOSPITAL | Age: 55
Discharge: HOME | End: 2020-02-04
Attending: OBSTETRICS & GYNECOLOGY
Payer: MEDICAID

## 2020-02-04 ENCOUNTER — PATIENT MESSAGE (OUTPATIENT)
Dept: OBSTETRICS AND GYNECOLOGY | Facility: CLINIC | Age: 55
End: 2020-02-04

## 2020-02-04 VITALS
DIASTOLIC BLOOD PRESSURE: 76 MMHG | BODY MASS INDEX: 25.97 KG/M2 | SYSTOLIC BLOOD PRESSURE: 122 MMHG | WEIGHT: 132.25 LBS | HEIGHT: 60 IN

## 2020-02-04 DIAGNOSIS — Z12.4 CERVICAL CANCER SCREENING: ICD-10-CM

## 2020-02-04 DIAGNOSIS — Z12.31 VISIT FOR SCREENING MAMMOGRAM: ICD-10-CM

## 2020-02-04 DIAGNOSIS — Z01.419 ROUTINE GYNECOLOGICAL EXAMINATION: Primary | ICD-10-CM

## 2020-02-04 PROCEDURE — 77067 SCR MAMMO BI INCL CAD: CPT | Mod: 26,,, | Performed by: RADIOLOGY

## 2020-02-04 PROCEDURE — 87491 CHLMYD TRACH DNA AMP PROBE: CPT

## 2020-02-04 PROCEDURE — 99999 PR PBB SHADOW E&M-EST. PATIENT-LVL III: ICD-10-PCS | Mod: PBBFAC,,, | Performed by: OBSTETRICS & GYNECOLOGY

## 2020-02-04 PROCEDURE — 87624 HPV HI-RISK TYP POOLED RSLT: CPT

## 2020-02-04 PROCEDURE — 77067 MAMMO DIGITAL SCREENING BILAT WITH TOMOSYNTHESIS_CAD: ICD-10-PCS | Mod: 26,,, | Performed by: RADIOLOGY

## 2020-02-04 PROCEDURE — 99396 PR PREVENTIVE VISIT,EST,40-64: ICD-10-PCS | Mod: S$PBB,,, | Performed by: OBSTETRICS & GYNECOLOGY

## 2020-02-04 PROCEDURE — 99396 PREV VISIT EST AGE 40-64: CPT | Mod: S$PBB,,, | Performed by: OBSTETRICS & GYNECOLOGY

## 2020-02-04 PROCEDURE — 77063 BREAST TOMOSYNTHESIS BI: CPT | Mod: 26,,, | Performed by: RADIOLOGY

## 2020-02-04 PROCEDURE — 99999 PR PBB SHADOW E&M-EST. PATIENT-LVL III: CPT | Mod: PBBFAC,,, | Performed by: OBSTETRICS & GYNECOLOGY

## 2020-02-04 PROCEDURE — 77063 MAMMO DIGITAL SCREENING BILAT WITH TOMOSYNTHESIS_CAD: ICD-10-PCS | Mod: 26,,, | Performed by: RADIOLOGY

## 2020-02-04 PROCEDURE — 77067 SCR MAMMO BI INCL CAD: CPT | Mod: TC

## 2020-02-04 PROCEDURE — 88175 CYTOPATH C/V AUTO FLUID REDO: CPT

## 2020-02-04 PROCEDURE — 99213 OFFICE O/P EST LOW 20 MIN: CPT | Mod: PBBFAC,PO | Performed by: OBSTETRICS & GYNECOLOGY

## 2020-02-04 RX ORDER — AMOXICILLIN AND CLAVULANATE POTASSIUM 875; 125 MG/1; MG/1
1 TABLET, FILM COATED ORAL 2 TIMES DAILY
COMMUNITY
Start: 2020-01-30

## 2020-02-04 RX ORDER — METHYLPREDNISOLONE 4 MG/1
TABLET ORAL DAILY
COMMUNITY
Start: 2020-02-03 | End: 2020-02-08

## 2020-02-04 RX ORDER — TRAZODONE HYDROCHLORIDE 100 MG/1
100 TABLET ORAL NIGHTLY
COMMUNITY
Start: 2020-01-28

## 2020-02-04 RX ORDER — VENLAFAXINE HYDROCHLORIDE 150 MG/1
150 CAPSULE, EXTENDED RELEASE ORAL DAILY
COMMUNITY
Start: 2020-01-28

## 2020-02-04 RX ORDER — DIAZEPAM 2 MG/1
2 TABLET ORAL NIGHTLY PRN
COMMUNITY
Start: 2020-01-28

## 2020-02-04 RX ORDER — BUSPIRONE HYDROCHLORIDE 15 MG/1
12.5 TABLET ORAL 3 TIMES DAILY
COMMUNITY
Start: 2020-01-29

## 2020-02-04 RX ORDER — VENLAFAXINE HYDROCHLORIDE 75 MG/1
75 CAPSULE, EXTENDED RELEASE ORAL DAILY PRN
COMMUNITY
Start: 2020-01-16

## 2020-02-04 RX ORDER — CETIRIZINE HYDROCHLORIDE 10 MG/1
10 TABLET ORAL NIGHTLY
Refills: 0 | COMMUNITY
Start: 2019-11-07

## 2020-02-04 NOTE — PROGRESS NOTES
53 y/o  perimenopausal female who presents for routine gyn visit.  She reports her hot flashes have resolved.  Patient's last menstrual period was 2019 (approximate).  No cycles since that time.  Now  from her .  Wants GC/chl testing only today.  Has lost weight since her last visit here.  mammgram scheduled for later today.  Positive tobacco use.    ROS:  GENERAL: Denies weight gain or weight loss. Feeling well overall.   SKIN: Denies rash or lesions.   HEAD: Denies head injury or headache.   CHEST: Denies chest pain or shortness of breath.   CARDIOVASCULAR: Denies palpitations or left sided chest pain.   ABDOMEN: No abdominal pain, constipation, diarrhea, nausea, vomiting or rectal bleeding.   URINARY: No frequency, dysuria, hematuria, or burning on urination.  REPRODUCTIVE: no complaints  BREASTS: denies pain, lumps, or nipple discharge.   HEMATOLOGIC: No easy bruisability or excessive bleeding.   MUSCULOSKELETAL: Denies joint pain or swelling.   NEUROLOGIC: Denies syncope or weakness.   PSYCHIATRIC: Denies depression, anxiety or mood swings.   ?  ?  PE:  /76   Ht 5' (1.524 m)   Wt 60 kg (132 lb 4.4 oz)   LMP 2019 (Approximate)   BMI 25.83 kg/m²   APPEARANCE: Well nourished, well developed, in no acute distress.  SKIN: Normal skin turgor, no lesions.  BREASTS: Symmetrical, no skin changes or visible lesions. No palpable masses, nipple discharge or adenopathy bilaterally.  PELVIC: Normal external female genitalia without lesions. Normal hair distribution. Adequate perineal body, normal urethral meatus. Vagina moist and well rugated without lesions or discharge. Cervix pink and without lesions. No significant cystocele or rectocele. Bimanual exam showed uterus normal size, shape, position, mobile and nontender. Adnexa without masses or tenderness. Urethra and bladder normal.  EXTREMITIES: No clubbing cyanosis or edema.     AP  Routine GYN  -s/p normal breast exam: mammogram  scheduled  -s/p normal pelvic exam  -Pap and hpv collected   -STD testing: gc/chl today  -colonoscopy: completed in 2015 - due in 2020 per GI note - to give patient this information  - endometrial bx 2017 normal     F/u in one year    alcon dudley MD

## 2020-02-04 NOTE — PATIENT INSTRUCTIONS
Hi Ms. Archer:    I wanted to provided you with a list of GI doctors that you could contact regarding getting your colonoscopy - as I believe you are due for one in 2020.    GI services  1) [facility] -[phone]  to schedule colonoscopy/endoscopy    2) [phone] to schedule colonoscopy/endoscopy, located in [address]    3) Dr. Hernesto Lucio   [address]  [place] [address]  Phone: [phone]      You would have to call these folks to see if they accept your Medicaid.    I hope this is helpful.    Dr dudley

## 2020-02-07 ENCOUNTER — PATIENT MESSAGE (OUTPATIENT)
Dept: OBSTETRICS AND GYNECOLOGY | Facility: CLINIC | Age: 55
End: 2020-02-07

## 2020-02-07 LAB
C TRACH DNA SPEC QL NAA+PROBE: NOT DETECTED
N GONORRHOEA DNA SPEC QL NAA+PROBE: NOT DETECTED

## 2020-02-12 LAB
HPV HR 12 DNA SPEC QL NAA+PROBE: NEGATIVE
HPV16 AG SPEC QL: NEGATIVE
HPV18 DNA SPEC QL NAA+PROBE: NEGATIVE

## 2020-03-03 LAB
FINAL PATHOLOGIC DIAGNOSIS: NORMAL
Lab: NORMAL

## 2020-09-30 ENCOUNTER — PATIENT MESSAGE (OUTPATIENT)
Dept: OBSTETRICS AND GYNECOLOGY | Facility: CLINIC | Age: 55
End: 2020-09-30

## 2020-11-03 ENCOUNTER — OFFICE VISIT (OUTPATIENT)
Dept: OBSTETRICS AND GYNECOLOGY | Facility: CLINIC | Age: 55
End: 2020-11-03
Payer: MEDICAID

## 2020-11-03 VITALS
SYSTOLIC BLOOD PRESSURE: 102 MMHG | BODY MASS INDEX: 24.33 KG/M2 | WEIGHT: 124.56 LBS | DIASTOLIC BLOOD PRESSURE: 80 MMHG

## 2020-11-03 DIAGNOSIS — R10.2 PELVIC PAIN: Primary | ICD-10-CM

## 2020-11-03 DIAGNOSIS — Z12.31 VISIT FOR SCREENING MAMMOGRAM: ICD-10-CM

## 2020-11-03 PROCEDURE — 99999 PR PBB SHADOW E&M-EST. PATIENT-LVL III: CPT | Mod: PBBFAC,,, | Performed by: OBSTETRICS & GYNECOLOGY

## 2020-11-03 PROCEDURE — 99212 PR OFFICE/OUTPT VISIT, EST, LEVL II, 10-19 MIN: ICD-10-PCS | Mod: S$PBB,,, | Performed by: OBSTETRICS & GYNECOLOGY

## 2020-11-03 PROCEDURE — 99212 OFFICE O/P EST SF 10 MIN: CPT | Mod: S$PBB,,, | Performed by: OBSTETRICS & GYNECOLOGY

## 2020-11-03 PROCEDURE — 99999 PR PBB SHADOW E&M-EST. PATIENT-LVL III: ICD-10-PCS | Mod: PBBFAC,,, | Performed by: OBSTETRICS & GYNECOLOGY

## 2020-11-03 PROCEDURE — 99213 OFFICE O/P EST LOW 20 MIN: CPT | Mod: PBBFAC,PO | Performed by: OBSTETRICS & GYNECOLOGY

## 2020-11-03 RX ORDER — FLUTICASONE PROPIONATE 50 MCG
SPRAY, SUSPENSION (ML) NASAL
COMMUNITY
Start: 2020-10-19

## 2020-11-03 NOTE — PROGRESS NOTES
53 yo female who presents for evaluation of right lower quadrant pain. Reports that pain started 2 months ago when she started during abdominal exercises.  Was doing over 200 situps a day.   Reports that she stopped during this much abdominal exercise and the pain resolved.  Pain was 10/10 on pain scale 2 months ago.  Today, patient reports no pain.    ROS: per HPI    PE:   Vitals: /80   Wt 56.5 kg (124 lb 9 oz)   LMP 09/29/2019 (Approximate)   BMI 24.33 kg/m²   APPEARANCE: Well nourished, well developed, in no acute distress.  ABDOMEN: Soft. No tenderness or masses. No hepatosplenomegaly. No hernias.  PELVIC: Normal external female genitalia without lesions. Normal hair distribution. Adequate perineal body, normal urethral meatus. Vagina moist and well rugated without lesions or discharge. Cervix pink and without lesions. No significant cystocele or rectocele. Bimanual exam showed uterus normal size, shape, position, mobile and nontender. Adnexa without masses or tenderness. Urethra and bladder normal.      AP: Pelvic pain, RLQ pain - resolved  Pelvic US to check for ovarian cyst - if patient desires to have this completed  Office UA negative    Mammogram ordered    Annual exam due after feb 2021    alcon dean MD

## 2020-11-04 ENCOUNTER — IMMUNIZATION (OUTPATIENT)
Dept: PHARMACY | Facility: CLINIC | Age: 55
End: 2020-11-04
Payer: MEDICAID

## 2021-04-15 ENCOUNTER — PATIENT MESSAGE (OUTPATIENT)
Dept: RESEARCH | Facility: HOSPITAL | Age: 56
End: 2021-04-15

## 2021-06-02 ENCOUNTER — HOSPITAL ENCOUNTER (OUTPATIENT)
Dept: RADIOLOGY | Facility: HOSPITAL | Age: 56
Discharge: HOME | End: 2021-06-02
Attending: OBSTETRICS & GYNECOLOGY
Payer: MEDICAID

## 2021-06-02 DIAGNOSIS — Z12.31 VISIT FOR SCREENING MAMMOGRAM: ICD-10-CM

## 2021-06-02 PROCEDURE — 77067 MAMMO DIGITAL SCREENING BILAT WITH TOMO: ICD-10-PCS | Mod: 26,,, | Performed by: RADIOLOGY

## 2021-06-02 PROCEDURE — 77067 SCR MAMMO BI INCL CAD: CPT | Mod: TC

## 2021-06-02 PROCEDURE — 77067 SCR MAMMO BI INCL CAD: CPT | Mod: 26,,, | Performed by: RADIOLOGY

## 2021-06-02 PROCEDURE — 77063 BREAST TOMOSYNTHESIS BI: CPT | Mod: 26,,, | Performed by: RADIOLOGY

## 2021-06-02 PROCEDURE — 77063 MAMMO DIGITAL SCREENING BILAT WITH TOMO: ICD-10-PCS | Mod: 26,,, | Performed by: RADIOLOGY

## 2021-06-04 ENCOUNTER — PATIENT MESSAGE (OUTPATIENT)
Dept: OBSTETRICS AND GYNECOLOGY | Facility: CLINIC | Age: 56
End: 2021-06-04

## 2021-06-04 ENCOUNTER — TELEPHONE (OUTPATIENT)
Dept: OBSTETRICS AND GYNECOLOGY | Facility: CLINIC | Age: 56
End: 2021-06-04

## 2022-02-21 ENCOUNTER — TELEPHONE (OUTPATIENT)
Dept: OBSTETRICS AND GYNECOLOGY | Facility: CLINIC | Age: 57
End: 2022-02-21
Payer: MEDICAID

## 2022-02-21 DIAGNOSIS — R30.0 DYSURIA: Primary | ICD-10-CM

## 2022-02-21 NOTE — TELEPHONE ENCOUNTER
----- Message from Carol Ann Mueller sent at 2/21/2022 12:01 PM CST -----  Regarding: UTI/Needs Appt Tomorrow  Type:  Patient Returning Call    Who Called: pt    Would the patient rather a call back or a response via [facility]? call  Best Call Back Number: [phone]

## 2022-02-21 NOTE — TELEPHONE ENCOUNTER
Order for urine culture placed.   Patient can leave sample in the lab down stairs.    If she needs assistance right now, she may want to consider f/u with her PCP or urgent care.    (last visit here was nov 2020). So, she would need to come in or have a urine culture collected before I can send an Rx for her.    Dr dudley

## 2022-02-22 ENCOUNTER — TELEPHONE (OUTPATIENT)
Dept: OBSTETRICS AND GYNECOLOGY | Facility: CLINIC | Age: 57
End: 2022-02-22
Payer: MEDICAID

## 2022-02-22 NOTE — TELEPHONE ENCOUNTER
----- Message from Ginette Farrell sent at 2/22/2022  1:46 PM CST -----  Contact: pt  Type:  Sooner Apoointment Request    Caller is requesting a sooner appointment.  Caller declined first available appointment listed below.  Caller will not accept being placed on the waitlist and is requesting a message be sent to doctor.    Name of Caller:pt    When is the first available appointment? May    Symptoms:uti    Would the patient rather a call back or a response via [facility]? Call     Best Call Back Number:[phone] (M)     Additional Information: pt called yesterday was sched incorrectly for her annual. She states she mentioned it was an uti for in April. Looked for sooner appt none available. Dr. Mena was March.      Can anything be done ?       [id number] - [place] - [address] AT [facility] [address]   Phone:  [phone]  Fax:  [phone]

## 2022-02-22 NOTE — TELEPHONE ENCOUNTER
Called and informed patient the appointment she has is the first available we have for an annual.

## 2022-04-01 ENCOUNTER — OFFICE VISIT (OUTPATIENT)
Dept: OBSTETRICS AND GYNECOLOGY | Facility: CLINIC | Age: 57
End: 2022-04-01
Payer: MEDICAID

## 2022-04-01 VITALS
BODY MASS INDEX: 23.72 KG/M2 | WEIGHT: 120.81 LBS | HEIGHT: 60 IN | SYSTOLIC BLOOD PRESSURE: 126 MMHG | DIASTOLIC BLOOD PRESSURE: 82 MMHG

## 2022-04-01 DIAGNOSIS — Z01.419 ROUTINE GYNECOLOGICAL EXAMINATION: Primary | ICD-10-CM

## 2022-04-01 DIAGNOSIS — R30.0 DYSURIA: ICD-10-CM

## 2022-04-01 DIAGNOSIS — Z12.31 VISIT FOR SCREENING MAMMOGRAM: ICD-10-CM

## 2022-04-01 DIAGNOSIS — Z12.11 COLON CANCER SCREENING: ICD-10-CM

## 2022-04-01 DIAGNOSIS — N76.0 ACUTE VAGINITIS: ICD-10-CM

## 2022-04-01 DIAGNOSIS — Z12.4 CERVICAL CANCER SCREENING: ICD-10-CM

## 2022-04-01 PROCEDURE — 99396 PREV VISIT EST AGE 40-64: CPT | Mod: S$PBB,,, | Performed by: OBSTETRICS & GYNECOLOGY

## 2022-04-01 PROCEDURE — 99999 PR PBB SHADOW E&M-EST. PATIENT-LVL IV: ICD-10-PCS | Mod: PBBFAC,,, | Performed by: OBSTETRICS & GYNECOLOGY

## 2022-04-01 PROCEDURE — 3079F PR MOST RECENT DIASTOLIC BLOOD PRESSURE 80-89 MM HG: ICD-10-PCS | Mod: CPTII,,, | Performed by: OBSTETRICS & GYNECOLOGY

## 2022-04-01 PROCEDURE — 3074F PR MOST RECENT SYSTOLIC BLOOD PRESSURE < 130 MM HG: ICD-10-PCS | Mod: CPTII,,, | Performed by: OBSTETRICS & GYNECOLOGY

## 2022-04-01 PROCEDURE — 99999 PR PBB SHADOW E&M-EST. PATIENT-LVL IV: CPT | Mod: PBBFAC,,, | Performed by: OBSTETRICS & GYNECOLOGY

## 2022-04-01 PROCEDURE — 3008F PR BODY MASS INDEX (BMI) DOCUMENTED: ICD-10-PCS | Mod: CPTII,,, | Performed by: OBSTETRICS & GYNECOLOGY

## 2022-04-01 PROCEDURE — 87624 HPV HI-RISK TYP POOLED RSLT: CPT | Performed by: OBSTETRICS & GYNECOLOGY

## 2022-04-01 PROCEDURE — 99214 OFFICE O/P EST MOD 30 MIN: CPT | Mod: PBBFAC,PO | Performed by: OBSTETRICS & GYNECOLOGY

## 2022-04-01 PROCEDURE — 88175 CYTOPATH C/V AUTO FLUID REDO: CPT | Performed by: OBSTETRICS & GYNECOLOGY

## 2022-04-01 PROCEDURE — 3074F SYST BP LT 130 MM HG: CPT | Mod: CPTII,,, | Performed by: OBSTETRICS & GYNECOLOGY

## 2022-04-01 PROCEDURE — 99396 PR PREVENTIVE VISIT,EST,40-64: ICD-10-PCS | Mod: S$PBB,,, | Performed by: OBSTETRICS & GYNECOLOGY

## 2022-04-01 PROCEDURE — 1159F PR MEDICATION LIST DOCUMENTED IN MEDICAL RECORD: ICD-10-PCS | Mod: CPTII,,, | Performed by: OBSTETRICS & GYNECOLOGY

## 2022-04-01 PROCEDURE — 3079F DIAST BP 80-89 MM HG: CPT | Mod: CPTII,,, | Performed by: OBSTETRICS & GYNECOLOGY

## 2022-04-01 PROCEDURE — 87591 N.GONORRHOEAE DNA AMP PROB: CPT | Performed by: OBSTETRICS & GYNECOLOGY

## 2022-04-01 PROCEDURE — 87491 CHLMYD TRACH DNA AMP PROBE: CPT | Performed by: OBSTETRICS & GYNECOLOGY

## 2022-04-01 PROCEDURE — 3008F BODY MASS INDEX DOCD: CPT | Mod: CPTII,,, | Performed by: OBSTETRICS & GYNECOLOGY

## 2022-04-01 PROCEDURE — 1159F MED LIST DOCD IN RCRD: CPT | Mod: CPTII,,, | Performed by: OBSTETRICS & GYNECOLOGY

## 2022-04-01 NOTE — PATIENT INSTRUCTIONS
GI services  --Please contact [facility] -[phone]  to schedule colonoscopy/endoscopy  ----Dr. Rodriguez  ----Dr. Alvares    -   Dr. Darrin Caicedo: GI

## 2022-04-01 NOTE — PROGRESS NOTES
55 y/o  postmenopausal female who presents for routine gyn visit.  She reports her hot flashes have resolved.  Patient's last menstrual period was 2019 (approximate).  No cycles since that time.  Now  from her .  Wants GC/chl testing only today.  (one sex partner since divorce - did use condoms).  Has lost more weight since her last visit here.  mammgram needs to be scheduled.  Positive tobacco use.  Needs to have colonoscopy.    Was suffering from urine problems awhile back. S/p treatment. Symptoms improved.    ROS:  GENERAL: Denies weight gain or weight loss. Feeling well overall.   SKIN: Denies rash or lesions.   HEAD: Denies head injury or headache.   CHEST: Denies chest pain or shortness of breath.   CARDIOVASCULAR: Denies palpitations or left sided chest pain.   ABDOMEN: No abdominal pain, constipation, diarrhea, nausea, vomiting or rectal bleeding.   URINARY: No frequency, dysuria, hematuria, or burning on urination.  REPRODUCTIVE: no complaints  BREASTS: denies pain, lumps, or nipple discharge.   HEMATOLOGIC: No easy bruisability or excessive bleeding.   MUSCULOSKELETAL: Denies joint pain or swelling.   NEUROLOGIC: Denies syncope or weakness.   PSYCHIATRIC: Denies depression, anxiety or mood swings.   ?  ?  PE:  /82   Ht 5' (1.524 m)   Wt 54.8 kg (120 lb 13 oz)   LMP 2019 (Approximate)   BMI 23.59 kg/m²   APPEARANCE: Well nourished, well developed, in no acute distress.  SKIN: Normal skin turgor, no lesions.  BREASTS: Symmetrical, no skin changes or visible lesions. No palpable masses, nipple discharge or adenopathy bilaterally.  PELVIC: Normal external female genitalia without lesions. Normal hair distribution. Adequate perineal body, normal urethral meatus. Vagina moist and well rugated without lesions or discharge. Cervix pink and without lesions. No significant cystocele or rectocele. Bimanual exam showed uterus normal size, shape, position, mobile and nontender.  Adnexa without masses or tenderness. Urethra and bladder normal.  EXTREMITIES: No clubbing cyanosis or edema.     AP  Routine GYN  -s/p normal breast exam: mammogram ordered  -s/p normal pelvic exam  -Pap and hpv collected   -STD testing: gc/chl today  -colonoscopy: request placed  -dysuria: resolved, but urine cx sent per patient request  - endometrial bx 2017 normal     F/u in one year    alcon dudley MD

## 2022-04-04 ENCOUNTER — TELEPHONE (OUTPATIENT)
Dept: ADMINISTRATIVE | Facility: OTHER | Age: 57
End: 2022-04-04
Payer: MEDICAID

## 2022-04-04 LAB
C TRACH DNA SPEC QL NAA+PROBE: NOT DETECTED
N GONORRHOEA DNA SPEC QL NAA+PROBE: NOT DETECTED

## 2022-04-08 LAB
FINAL PATHOLOGIC DIAGNOSIS: NORMAL
Lab: NORMAL

## 2022-04-11 ENCOUNTER — TELEPHONE (OUTPATIENT)
Dept: ENDOSCOPY | Facility: HOSPITAL | Age: 57
End: 2022-04-11
Payer: MEDICAID

## 2022-04-11 NOTE — TELEPHONE ENCOUNTER
Spoke to Myrtle Archer notified insurance not in network, will call [facility] to see if they do the colonoscopy, otherwise will call [facility]

## 2022-04-12 NOTE — PROGRESS NOTES
Pap and hpv are normal    Your pelvic exam will still need to occur once a year!    See you then!    Dr dudley

## 2022-06-06 ENCOUNTER — HOSPITAL ENCOUNTER (OUTPATIENT)
Dept: RADIOLOGY | Facility: HOSPITAL | Age: 57
Discharge: HOME | End: 2022-06-06
Attending: OBSTETRICS & GYNECOLOGY
Payer: MEDICAID

## 2022-06-06 DIAGNOSIS — Z12.31 VISIT FOR SCREENING MAMMOGRAM: ICD-10-CM

## 2022-06-06 PROCEDURE — 77063 BREAST TOMOSYNTHESIS BI: CPT | Mod: TC

## 2022-06-06 PROCEDURE — 77063 MAMMO DIGITAL SCREENING BILAT WITH TOMO: ICD-10-PCS | Mod: 26,,, | Performed by: RADIOLOGY

## 2022-06-06 PROCEDURE — 77063 BREAST TOMOSYNTHESIS BI: CPT | Mod: 26,,, | Performed by: RADIOLOGY

## 2022-06-06 PROCEDURE — 77067 SCR MAMMO BI INCL CAD: CPT | Mod: 26,,, | Performed by: RADIOLOGY

## 2022-06-06 PROCEDURE — 77067 MAMMO DIGITAL SCREENING BILAT WITH TOMO: ICD-10-PCS | Mod: 26,,, | Performed by: RADIOLOGY

## 2022-12-16 ENCOUNTER — TELEPHONE (OUTPATIENT)
Dept: GASTROENTEROLOGY | Facility: CLINIC | Age: 57
End: 2022-12-16
Payer: MEDICAID

## 2022-12-16 ENCOUNTER — TELEPHONE (OUTPATIENT)
Dept: OBSTETRICS AND GYNECOLOGY | Facility: CLINIC | Age: 57
End: 2022-12-16
Payer: MEDICAID

## 2022-12-16 NOTE — TELEPHONE ENCOUNTER
----- Message from Afsaneh Burnette sent at 12/16/2022 11:22 AM CST -----  Type:  Patient Returning Call    Who Called:Patient   [facility] for Patient[facility]   Does the patient know what this is regarding?:Scheduling a colonoscopy   Would the patient rather a call back or a response via [facility]? Yes call back   Best Call Back Number:cell [phone] work number from 9 to 3 [phone]  Additional Information:

## 2022-12-16 NOTE — TELEPHONE ENCOUNTER
----- Message from Rose Collado MA sent at 12/15/2022  7:36 PM CST -----  Contact: pt    ----- Message -----  From: Kush Vincent  Sent: 12/15/2022   3:05 PM CST  To: [facility] Staff     .Type:  Patient Requesting Referral    Who Called:pt  Does the patient already have the specialty appointment scheduled?:  If yes, what is the date of that appointment?:  Referral to What Specialty:  Gastreology  Reason for Referral:Colonscopy  Does the patient want the referral with a specific physician?:  Is the specialist an [facility]?:[facility]  Patient Requesting a Response?:Yes  Would the patient rather a call back or a response via [facility]? Call back  Best Call Back Number:[phone]  Additional Information: Pt. Is requesting an order for a colonscopy be sent to [facility].  [phone]

## 2022-12-20 ENCOUNTER — TELEPHONE (OUTPATIENT)
Dept: GASTROENTEROLOGY | Facility: CLINIC | Age: 57
End: 2022-12-20
Payer: MEDICAID

## 2022-12-20 NOTE — TELEPHONE ENCOUNTER
----- Message from Milena Celis sent at 12/20/2022 12:49 PM CST -----  Needs advice from nurse:      Who Called:pt  Regarding:returning a call to [facility]  Would the patient rather a call back or VIA [facility]?  Best Call Back number:[phone]  Additional Info:

## 2022-12-20 NOTE — TELEPHONE ENCOUNTER
Returned patient's call. Patient having problems with constipation. Informed patient that she would need an office visit. Patient does not want to come to [place]. Instructed patient to call her insurance company to find out where she can go.

## 2023-01-06 ENCOUNTER — TELEPHONE (OUTPATIENT)
Dept: OBSTETRICS AND GYNECOLOGY | Facility: CLINIC | Age: 58
End: 2023-01-06
Payer: MEDICAID

## 2023-01-06 NOTE — TELEPHONE ENCOUNTER
----- Message from Maya Chung sent at 1/6/2023  1:55 PM CST -----  Type:  Needs Medical Advice    Who Called: pt  Symptoms (please be specific): pt is requesting to get a colonoscopy done      Would the patient rather a call back or a response via [facility]? call  Best Call Back Number: [phone]  Additional Information:

## 2024-01-31 ENCOUNTER — PATIENT MESSAGE (OUTPATIENT)
Dept: OBSTETRICS AND GYNECOLOGY | Facility: CLINIC | Age: 59
End: 2024-01-31
Payer: MEDICAID